# Patient Record
Sex: MALE | URBAN - METROPOLITAN AREA
[De-identification: names, ages, dates, MRNs, and addresses within clinical notes are randomized per-mention and may not be internally consistent; named-entity substitution may affect disease eponyms.]

---

## 2018-12-17 ENCOUNTER — OFFICE VISIT (OUTPATIENT)
Dept: PODIATRY | Facility: CLINIC | Age: 68
End: 2018-12-17
Payer: COMMERCIAL

## 2018-12-17 VITALS
HEIGHT: 75 IN | DIASTOLIC BLOOD PRESSURE: 74 MMHG | WEIGHT: 263 LBS | BODY MASS INDEX: 32.7 KG/M2 | SYSTOLIC BLOOD PRESSURE: 132 MMHG

## 2018-12-17 DIAGNOSIS — M21.619 BUNION: ICD-10-CM

## 2018-12-17 DIAGNOSIS — R20.2 PARESTHESIA OF LEFT FOOT: ICD-10-CM

## 2018-12-17 DIAGNOSIS — M21.40 PES PLANUS, UNSPECIFIED LATERALITY: ICD-10-CM

## 2018-12-17 PROCEDURE — 99203 OFFICE O/P NEW LOW 30 MIN: CPT | Performed by: PODIATRIST

## 2018-12-17 RX ORDER — CLINDAMYCIN HCL 300 MG
CAPSULE ORAL
Refills: 2 | COMMUNITY
Start: 2018-11-12

## 2018-12-17 ASSESSMENT — MIFFLIN-ST. JEOR: SCORE: 2043.59
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
## 2018-12-17 NOTE — PATIENT INSTRUCTIONS
"Thank you for choosing [facility]!    Follow up as needed    DR. FRAZIER'S CLINIC LOCATIONS     MONDAY  [place] TUESDAY & FRIDAY AM  JOSE   [address]   [address]   [place] [address] [place] [address]   [phone]  [phone]  [phone]       [facility]  [facility] [phone]   [address] [facility] APPOINTMENTS: [phone]   [place] [address] BILLING QUESTIONS: [phone]   [phone]         Bunion (hallux abducto valgus)   A bunion is caused by muscle imbalance. The great toe is pulled toward the smaller toes. The metatarsal head is pushed outward creating a lump on the side of your foot. Imbalance is the result of foot structure and instability.   Bunions do not improve with time. They usually enlarge, however this is a fairly slow process. Shoes do not necessarily cause bunions, however, they can hasten development and definitely cause bunions to hurt.   Bunions often run in families. We inherit a certain foot structure, which may be predisposed to bunion development.   Bunion pain is likely a combination of shoes rubbing on the bump, nerve irritation, compression between the toes, joint misalignment, arthritis and altered gait.   Signs & Symptoms of \"Bunions\"   Bunions are usually termed mild, moderate or severe. Just because you have a bunion does not mean you have to have pain. There are some people with very severe bunions and no pain and people with mild bunions and a lot of pain.    1.  Pain on the inside of your foot at the big toe joint (1st MTPJ)    2.  Swelling on the inside of your foot at the big toe joint    3.  Redness on the inside of your foot at the big toe joint    4.  Numbness or burning in the big toe (hallux)    5.  Decreased motion at the big toe joint    6.  Painful bursa (fluid-filled sac) on the inside of your foot at the big toe joint    7.  Pain " "while wearing shoes -especially shoes too narrow or with high heels     8.  Pain during activities    9.  Corn in between the big toe and second toe    10.  Callous formation on the side or bottom of the big toe or big toe joint    11.  Callous under the second toe joint (2nd MTPJ)    12.  Pain in the second toe joint   Treatment for \"Bunions\"   Conservative (non-surgical) treatment will not make the bunion go away, but it will hopefully decrease the signs and symptoms you have and help you get rid of the pain and get you back to your activities.    1.  Wider shoes    2.  Extra depth shoes    3.  Stretch shoes (where bunion is) Cut an \"x\" or cross in the shoe (where bunion is)    4.  Ice    5.  Padding, splints, toe spacers    6.  NSAIDs    7.  Arch supports    8.  Custom orthoses (orthotics)    9.  Change activities    10.  Physical therapy     11.  Surgical treatment for bunions is sometimes needed. If you are limited by pain, cannot fit in shoes comfortably and are not able to do your daily activities then surgery may be a good option for you. There are many different surgical procedures to repair bunions. Your foot doctor (podiatrist) will review your foot exam findings, your x-rays, your age, your health, your lifestyle, your physical activity level and discuss with you which procedure he or she would recommend. Surgical procedures for bunions range from soft tissue repair to cutting and realigning the bones. It is not recommended that you have bunion surgery for cosmetic reasons (you do not like how your foot looks) or because you want to fit in a certain pair of shoes; There is the risk that even after surgery, the bunion will reoccur 9-10% of the time.   Most bunion pain can be improved simply by wearing compatible shoes. People with bunions cannot choose footwear simply because they like the style. Your bunion should determine which shoes are to be worn. Wide shoes with nonirritating seams,soft leather and " a square toe box are most compatible with a bunion. Shoes should fit appropriately right out of the box but may need to be professionally stretched and modified to accommodate the bump. Heels, dress shoes and shoes with pointed toes will not be comfortable.   Shoe inserts or orthotics will often times help with bunion pain, however inserts make shoe fit more challenging. Pads placed over the bunion can also help relieve the pain. Injection may help with nerve irritation.   Bunion surgery involves cutting and repositioning the bones surrounding the bunion. Pins and screws are used to hold the bones in place during the healing process. The goal of bunion surgery is to reduce the size of the bunion bump. Realignment of the toe and joint is attempted.     Some first toes cannot be forced back into normal alignment even with surgery. Surgery is helpful in most cases but does not necessarily create a normal foot.   Healing after surgery requires about six weeks of protection. This allows the bone to heal. Maximum recovery takes about one year. The scar tissue and joint structures require this amount of time to finish the healing process. Expect stiffness, swelling and numbness during that time frame. Bunion surgery does involve side effects. Some side effects are predictable and others are less common but do occur. A scar will be visible and could be irritated by shoes. The shoe may rub on the screw or internal pin requiring surgical removal of these fixation devices. The screw and pin would likely be left in place for a full year. The first toe may loose motion after bunion surgery. The amount of stiffness is variable. Some people never regain normal motion of the first toe. This is due to scar tissue inherent to any surgery. The first toe may drift toward the second toe or away from the second toe. Spreading of the first and second toes is a rare occurrence after bunion surgery. This can be quite bothersome and would  need to be surgically repaired. Toe drift toward the second toe could result in a recurrent bunion and revision surgery. Joint fusion is one option to correct an unstable, drifting toe. This procedure straightens the toe, however, no motion remains. Fusion may be necessary to correct complications of bunion surgery or as the original procedure in severe cases.   All surgical procedures involve risk of infection, numbness, pain, delayed healing, osteotomy dislocation, blood clots, continued foot pain, etc. Bunion surgery is quite complex and should not be taken lightly.   Any skin incision can lead to infection. Deep infection might involve the bone and thus repeat surgery and six weeks of IV antibiotics. Scar tissue can cause nerve pain or numbness. This is generally temporary but can be permanent. We do not have treatments that cure nerve problems. Second toe pain could be related to altered mechanics and pressure transferred to the second toe. Most feet with bunions have pre-existing second toe problems. Delayed bone healing would lengthen the healing time. Some bones simply do not heal. This requires repeat surgery, electronic bone stimulation and/or extended protection. Smokers have an approximate 20% chance of poor bone healing. This is double that of a non-smoker. The bone cut may displace. This may need to be repaired with a second operation. Displacement can cause joint malalignment. Immobility after surgery can cause blood clots in the legs and lungs. This could result in death.   Foot pain is complex. Most feet hurt for more than one reason. Fixing the bunion would not necessarily create a pain free foot. Appropriate shoes, healthy body weight, avoidance of bare foot walking and moderation of activity will always be necessary to enjoy foot comfort. Your bunion may involve arthritis, which is incurable even with surgery. Long standing bunions often involve chronic irritation to the surrounding nerves. Nerve  "pain may not resolve even with reducing the bunion bump since permanent nerve damage may be present   Bunion surgery is nevertheless quite successful. Most surgical patients are pleased with their foot following bunion surgery. Many of the issues described above can be controlled by taking proper care of your foot during the healing process.   Cosmetic bunion surgery is discouraged for the reasons listed above. A bunion that is comfortable when wearing appropriate shoes should simply be treated with appropriate shoes.   Your surgeon would be happy to fully describe any of the above issues. You should pursue a full understanding of the operation,recovery process and any potential problems that could develop.   Prevention of \"Bunions\"    1.  Do not wear high heels if there is a family history of bunions.   2.  Wear shoes that have enough width and depth in the toe box.  Use a motion control arch support if you over-pronate (foot rolls in)       FLAT FEET   Flatfoot is often a complex disorder, with diverse symptoms and varying degrees of deformity and disability. There are several types of flatfoot, all of which have one characteristic in common: partial or total collapse (loss) of the arch.  Other characteristics shared by most types of flatfoot include:   Toe drift,  in which the toes and front part of the foot point outward   The heel tilts toward the outside and the ankle appears to turn in   A tight Achilles tendon, which causes the heel to lift off the ground earlier when walking and may make the problem worse   Bunions and hammertoes may develop as a result of a flatfoot.   Flexible Flatfoot  Flexible flatfoot is one of the most common types of flatfoot. It typically begins in childhood or adolescence and continues into adulthood. It usually occurs in both feet and progresses in severity throughout the adult years. As the deformity worsens, the soft tissues (tendons and ligaments) of the arch may stretch or " tear and can become inflamed.  The term  flexible  means that while the foot is flat when standing (weight-bearing), the arch returns when not standing.  SYMPTOMS  Pain in the heel, arch, ankle, or along the outside of the foot    Rolled-in  ankle (over-pronation)   Pain along the shin bone (shin splint)   General aching or fatigue in the foot or leg   Low back, hip or knee pain.   DIAGNOSIS  In diagnosing flatfoot, the foot and ankle surgeon examines the foot and observes how it looks when you stand and sit. X-rays are usually taken to determine the severity of the disorder. If you are diagnosed with flexible flatfoot but you don t have any symptoms, your surgeon will explain what you might expect in the future.  NON-SURGICAL TREATMENT  If you experience symptoms with flexible flatfoot, the surgeon may recommend non-surgical treatment options, including:  Activity modifications. Cut down on activities that bring you pain and avoid prolonged walking and standing to give your arches a rest.   Weight loss. If you are overweight, try to lose weight. Putting too much weight on your arches may aggravate your symptoms.   Orthotic devices. Your foot and ankle surgeon can provide you with custom orthotic devices for your shoes to give more support to the arches.   Immobilization. In some cases, it may be necessary to use a walking cast or to completely avoid weight-bearing.   Medications. Nonsteroidal anti-inflammatory drugs (NSAIDs), such as ibuprofen, help reduce pain and inflammation.   Physical therapy. Ultrasound therapy or other physical therapy modalities may be used to provide temporary relief.   Shoe modifications. Wearing shoes that support the arches is important for anyone who has flatfoot.   SURGICAL TREATMENT  In some patients whose pain is not adequately relieved by other treatments, surgery may be considered. A variety of surgical techniques is available to correct flexible flatfoot, and one or a combination  of procedures may be required to relieve the symptoms and improve foot function.  In selecting the procedure or combination of procedures for your particular case, the foot and ankle surgeon will take into consideration the extent of your deformity based on the x-ray findings, your age, your activity level, and other factors. The length of the recovery period will vary, depending on the procedure or procedures performed.        Body Mass Index (BMI)  Many things can cause foot and ankle problems. Foot structure, activity level, foot mechanics and injuries are common causes of pain.  One very important issue that often goes unmentioned, is body weight. Extra weight can cause increased stress on muscles, ligaments, bones and tendons.  Sometimes just a few extra pounds is all it takes to put one over her/his threshold. Without reducing that stress, it can be difficult to alleviate pain. Some people are uncomfortable addressing this issue, but we feel it is important for you to think about it. As Foot &  Ankle specialists, our job is addressing the lower extremity problem and possible causes. Regarding extra body weight, we encourage patients to discuss diet and weight management plans with their primary care doctors. It is this team approach that gives you the best opportunity for pain relief and getting you back on your feet.

## 2019-03-21 ENCOUNTER — PRE VISIT (OUTPATIENT)
Dept: UROLOGY | Facility: CLINIC | Age: 69
End: 2019-03-21

## 2019-03-21 NOTE — TELEPHONE ENCOUNTER
MEDICAL RECORDS REQUEST   [facility]  Urology Clinic  [address] [place] [address]  PHONE: [phone]  Fax: [phone]        FUTURE VISIT INFORMATION                                                   Holden Jordan, : 1950 scheduled for future visit at [facility]    APPOINTMENT INFORMATION:    Date: 2019    Provider:  CELESTINO ALBARRAN    Reason for Visit/Diagnosis: FREQUENCY    REFERRAL INFORMATION:    Referring provider:  ANDREY DE DIOS    Specialty: MD    Referring providers clinic:  [facility]    Clinic contact number:  [phone]    RECORDS REQUESTED FOR VISIT                                                     NOTES  STATUS/DETAILS   OFFICE NOTE from referring provider  yes   OFFICE NOTE from other specialist  no   DISCHARGE SUMMARY from hospital  no   DISCHARGE REPORT from the ER  no   OPERATIVE REPORT  yes   MEDICATION LIST  yes       PRE-VISIT CHECKLIST      Record collection complete Yes   Appointment appropriately scheduled           (right time/right provider) Yes   MyChart activation If no, please explain IN PROCESS   Questionnaire complete If no, please explain IN PROCESS     Completed by: Zainab Elkins

## 2019-03-25 ENCOUNTER — DOCUMENTATION ONLY (OUTPATIENT)
Dept: CARE COORDINATION | Facility: CLINIC | Age: 69
End: 2019-03-25

## 2019-03-27 ENCOUNTER — PRE VISIT (OUTPATIENT)
Dept: UROLOGY | Facility: CLINIC | Age: 69
End: 2019-03-27

## 2019-04-02 ENCOUNTER — TELEPHONE (OUTPATIENT)
Dept: UROLOGY | Facility: CLINIC | Age: 69
End: 2019-04-02

## 2019-04-02 NOTE — TELEPHONE ENCOUNTER
Patient notified that he does not need a  after having the UDS procedure, unless he is prescribed Valium. Patient agreed with the plan.      Collette La MA

## 2019-04-02 NOTE — TELEPHONE ENCOUNTER
[facility] Call Center    Phone Message    May a detailed message be left on voicemail: yes    Reason for Call: Other: Pt is wanting to know if there will be anything done at his upcoming appointment that would need him to arrange for someone to drive him. Please give him a call and let him know if he'll be able to drive him self to/from this appointment.     Action Taken: Message routed to:  [facility] ([facility] Urology

## 2019-04-03 ENCOUNTER — PRE VISIT (OUTPATIENT)
Dept: UROLOGY | Facility: CLINIC | Age: 69
End: 2019-04-03

## 2019-04-03 NOTE — TELEPHONE ENCOUNTER
Chief Complaint : UDS[facility]    New Hx/Sx: Increased Frq    Records/Orders/Proced: Available    Pt Contacted: Letter Sent and Spoke with Gabriella    At Rooming: LOREE

## 2019-04-08 ENCOUNTER — OFFICE VISIT (OUTPATIENT)
Dept: UROLOGY | Facility: CLINIC | Age: 69
End: 2019-04-08
Payer: COMMERCIAL

## 2019-04-08 ENCOUNTER — ANCILLARY PROCEDURE (OUTPATIENT)
Dept: RADIOLOGY | Facility: AMBULATORY SURGERY CENTER | Age: 69
End: 2019-04-08
Attending: NURSE PRACTITIONER
Payer: MEDICARE

## 2019-04-08 VITALS
SYSTOLIC BLOOD PRESSURE: 135 MMHG | HEART RATE: 69 BPM | BODY MASS INDEX: 32.5 KG/M2 | WEIGHT: 260 LBS | DIASTOLIC BLOOD PRESSURE: 79 MMHG

## 2019-04-08 DIAGNOSIS — R35.0 URINARY FREQUENCY: ICD-10-CM

## 2019-04-08 DIAGNOSIS — G54.6 PHANTOM LIMB PAIN (H): ICD-10-CM

## 2019-04-08 DIAGNOSIS — R35.0 FREQUENCY OF URINATION: Primary | ICD-10-CM

## 2019-04-08 RX ORDER — CIPROFLOXACIN 500 MG/1
500 TABLET, FILM COATED ORAL ONCE
Status: COMPLETED | OUTPATIENT
Start: 2019-04-08 | End: 2019-04-08

## 2019-04-08 RX ORDER — POLYETHYLENE GLYCOL 3350 17 G/17G
1 POWDER, FOR SOLUTION ORAL DAILY
COMMUNITY

## 2019-04-08 RX ADMIN — CIPROFLOXACIN 500 MG: 500 TABLET, FILM COATED ORAL at 08:05

## 2019-04-08 ASSESSMENT — PAIN SCALES - GENERAL: PAINLEVEL: NO PAIN (0)

## 2019-04-08 NOTE — PROGRESS NOTES
PREPROCEDURE DIAGNOSES:    1. BPH with LUTS (hesitancy, frequency, slowed stream)    POSTPROCEDURE DIAGNOSES:  -Small bladder capacity (283 mL) with delayed filling sensations.  -Fair bladder compliance without DO/DOI.  -No LEAH.  -Good detrusor contraction during voiding to max Pdet 42 cm H2O.  -Slow flow rate (Qmax 5.0 mL/s) with an intermittent, pleateaud flow curve and near-complete bladder emptying (final PVR 83 mL).  -Mostly quiet EMG activity during voiding, with some late activity which correlates with valsalva effort  -BOOI is not suggestive of bladder outlet obstruction  -Fluoroscopy reveals a moderately-trabeculated bladder wall without diverticulae or cellules.  No vesicoureteral reflux was observed.  The bladder neck was closed during filling and appears closed during voiding.  -The patient mentioned his right amputation stump pain several times throughout the course of our study, referencing his insufficient pain control regimen. [facility]. Order placed.    PROCEDURE:    1. Uroflowmetry.  2. Sterile urethral catheterization for measurement of postvoid residual urine volume.  3. Complex filling cystometrogram with measurement of bladder and rectal pressures.  4. Complex voiding cystometrogram with measurement of bladder and rectal pressures.  5. Electromyography of the pelvic floor during urodynamics.  6. Fluoroscopic imaging of the bladder during urodynamics, at least 3 views.    7. Interpretation of urodynamics and flouroscopic imaging.      INDICATIONS FOR PROCEDURE:  Mr. Holden Jordan is a pleasant 68 year old male with BPH with LUTS (hesitancy, frequency, slowed stream). Baseline video urodynamic assessment is requested today by Ananya Parker [facility] to better characterize Mr. Holden Jordan's voiding dysfunction.      VOIDING DIARY:  Voids every 30 minutes- 1 hour during the day,  nocturia x 1.  Episodes of incontinence: None  Total Volume Intake: 2800 mL; water, diet soda.  Total Volume Output: 3000 mL; average voided volume 140 mL, largest voided volume 280 mL.    DESCRIPTION OF PROCEDURE:  Risks, benefits, and alternatives to urodynamics were discussed with the patient and he wished to proceed.  Urodynamics are planned to better assess the primary etiology for Mr. Jordan's urologic dysfunction.  The patient does not currently take anticholinergic medication.  After informed consent was obtained, the patient was taken to the procedure room where uroflowmetry was performed. Findings below.     PRE-STUDY UROFLOWMETRY:  Volume insufficient to analyze  Postvoid residual by catheter: 250 mL.   Pretest urine dipstick was negative for leukocytes and nitrites.    Next a 7F double-lumen urodynamics catheter was inserted into the bladder under sterile technique via the urethra.  A 7F abdominal manometry catheter was placed in the rectum.  EMG pads were placed on both sides of the anal verge.  The bladder was filled with 200 mL of Omnipaque at 30 mL/minute and serial pressures were recorded.  With coughing there was an appropriate rise in vesical and abdominal pressures with no change in detrusor pressure, confirming good study catheter placement.    DURING THE FILLING PHASE:  First sensation: 231 mL.  First Desire: 242 mL.  Strong Desire: 279 mL.  Maximum Capacity: 283 mL.    Uninhibited detrusor contractions: None.  Compliance: Fair. PDet=18 cmH20 at capacity. Compliance ratio of 15.  Continence: No DOI or LEAH  EMG: Mostly concordant during filling.    DURING THE VOIDING PHASE:  Maximum detrusor contraction with void: 42 cm of H2O pressure.   Voided volume: 200 mL.  Maximum flow rate: 5.0 mL/sec.  Average flow rate: 2.2 mL/sec.  Postvoid Residual: 83 mL.  EMG activity: Some increased activity which correlates with late valsalva effort.  Character of voiding curve: Intermittent plateau.  BOOI: 15.6  (suggesting no obstruction - see key below)  [obstructed (ALBARADO index [BOOI] ? 40); equivocal (no definite   obstruction; BOOI 20-40); and no obstruction (BOOI ? 20)]    FLUOROSCOPIC IMAGING OF THE BLADDER DURING URODYNAMICS:  Please note, image numbers on UDS tracings correlate with iSite series numbers on PACS images. Fluoroscopy during today's procedure demonstrated a moderately-trabeculated bladder wall without diverticulae or cellules.  No vesicoureteral reflux was observed.  The bladder neck was closed during filling and appears closed during voiding.  After voiding to completion, all catheters were removed and the patient was brought back into the consultation room to further discuss today's study results.      ASSESSMENT/PLAN:  Mr. Holden Jordan is a pleasant 68 year old male with BPH with LUTS (hesitancy, frequency, slowed stream) who demonstrated the following findings today on urodynamic evaluation:    -Small bladder capacity (283 mL) with delayed filling sensations.  -Fair bladder compliance without DO/DOI.  -No LEAH.  -Good detrusor contraction during voiding to max Pdet 42 cm H2O.  -Slow flow rate (Qmax 5.0 mL/s) with an intermittent, pleateaud flow curve and near-complete bladder emptying (final PVR 83 mL).  -Mostly quiet EMG activity during voiding, with some late activity which correlates with valsalva effort  -BOOI is not suggestive of bladder outlet obstruction  -Fluoroscopy reveals a moderately-trabeculated bladder wall without diverticulae or cellules.  No vesicoureteral reflux was observed.  The bladder neck was closed during filling and appears closed during voiding.  -The patient mentioned his right amputation stump pain several times throughout the course of our study, referencing his insufficient pain control regimen. [facility] to [facility]. Order placed.    [facility] to further discuss today's study results and make plans for how best to proceed.      - A single Cipro was provided for UTI prophylaxis following completion of today's study per department protocol.  The risk of UTI with VUDS is low at ~2.5-3%.      Thank you for allowing me to participate in the care of Mr. Holden Jordan and please don't hesitate to contact me with any questions or concerns.      SONJA Ospina, CNP  Urology Nurse Practitioner

## 2019-04-08 NOTE — PATIENT INSTRUCTIONS
[facility] with [facility].   A referral was made for you to the Pain and Interventional Clinic.    It was a pleasure meeting with you today.  Thank you for allowing me and my team the privilege of caring for you today.  YOU are the reason we are here, and I truly hope we provided you with the excellent service you deserve.  Please let us know if there is anything else we can do for you so that we can be sure you are leaving completely satisfied with your care experience.      Inderjit Schroeder, EMT

## 2019-04-08 NOTE — NURSING NOTE
[facility]  Hx of increased Frq and slow stream  He denies other urinary sx and pain      Chief Complaint   Patient presents with     Urodynamics Study     [facility]       Blood pressure 135/79, pulse 69, weight 117.9 kg (260 lb). Body mass index is 32.5 kg/m .    Patient Active Problem List   Diagnosis     Chronic pain disorder     Major depressive disorder, recurrent episode, moderate (H)     Pain, phantom limb (H)     Stump neuralgia       Allergies   Allergen Reactions     Diazepam      Oxycodone Itching     Tramadol Nausea     Zomig [Zolmitriptan]      Zonisamide        Current Outpatient Medications   Medication Sig Dispense Refill     aspirin 81 MG tablet Take 1 tablet by mouth daily.       Aspirin-Acetaminophen-Caffeine (EXCEDRIN PO) Take  by mouth as needed. Up to once a week.       chlorthalidone (HYGROTEN) 25 MG tablet Take 1 tablet by mouth daily.       cholecalciferol (VITAMIN D) 400 UNIT TABS Take 400 Units by mouth daily.       clindamycin (CLEOCIN) 300 MG capsule TK TWO CS PO ONE HOUR PRIOR TO DENTAL APPOINTMENT.  2     CLINDAMYCIN HCL PO Take 75 mg by mouth       fish oil-omega-3 fatty acids (FISH OIL) 1000 MG capsule Take 2 g by mouth daily.       HYDROcodone-acetaminophen (VICODIN) 5-500 MG per tablet Take 1-2 tablets by mouth 3 times daily as needed.       lovastatin (MEVACOR) 40 MG tablet Take 40 mg by mouth At Bedtime.       Multiple Vitamin (DAILY MULTIVITAMIN PO) Take 1 tablet by mouth daily.       polyethylene glycol (MIRALAX/GLYCOLAX) packet Take 1 packet by mouth daily         Social History     Tobacco Use     Smoking status: Former Smoker     Years: 34.00     Types: Cigarettes     Last attempt to quit: 1978     Years since quittin.2     Smokeless tobacco: Never Used   Substance Use Topics     Alcohol use: None     Drug use: None       Invasive Procedure Safety Checklist:    Procedure: Urodynamics    Action: Complete sections and checkboxes as  appropriate.  Pre-procedure:  1. Patient ID Verified with 2 identifiers ([place] and  or MRN) : YES    2. Procedure and site verified with patient/designee (when able) : YES    3. Accurate consent documentation in medical record : YES    4. H&P (or appropriate assessment) documented in medical record : N/A  H&P must be up to 30 days prior to procedure an updated within 24 hours of                 Procedure as applicable.     5. Relevant diagnostic and radiology test results appropriately labeled and displayed as applicable : YES    6. Blood products, implants, devices, and/or special equipment available for the procedure as applicable : YES    7. Procedure site(s) marked with provider initials [Exclusions: none] : NO    8. Marking not required. Reason : Yes  Procedure does not require site marking    Time Out:     Time-Out performed immediately prior to starting procedure, including verbal and active participation of all team members addressing: YES    1. Correct patient identity.  2. Confirmed that the correct side and site are marked.  3. An accurate procedure to be done.  4. Agreement on the procedure to be done.  5. Correct patient position.  6. Relevant images and results are properly labeled and appropriately displayed.  7. The need to administer antibiotics or fluids for irrigation purposes during the procedure as applicable.  8. Safety precautions based on patient history or medication use.    During Procedure: Verification of correct person, site, and procedure occurs any time the responsibility for care of the patient is transferred to another member of the care team.    The following medication was given: Ciprofloxacin    MEDICATION:  Ciprofloxacin  ROUTE: PO  SITE: Orally  DOSE: 500mg  LOT #: [id number]  : [employer]  EXPIRATION DATE: 10/20  NDC#: 448674   Was there drug waste? No    Prior to administration, verified patient identity using patient's name and date of birth.  Due to  administration, patient instructed to remain in clinic for 15 minutes  afterwards, and to report any adverse reaction to me immediately.    Drug Amount Wasted:  None.  Vial/Syringe: Single dose vial      The following medication was given:     MEDICATION:  Omnipaque (Iohexol Injection)  ROUTE: Provider Administered  SITE: Provider Administered  DOSE: 240mL  LOT #: [id number]  : [employer]  EXPIRATION DATE: 09-11-21  NDC#: [address]-74   Was there drug waste? No    Prior to injection, verified patient identity using patient's name and date of birth.  Due to injection administration, patient instructed to remain in clinic for 15 minutes  afterwards, and to report any adverse reaction to me immediately.    Drug Amount Wasted:  None.  Vial/Syringe: Single dose vial    Holden Jordan comes [facility]    Order has been verified: Yes.      The following medication was given: See Above    Insertion:  14 Fr Coude tipped latex wiseman catheter inserted into urethral meatus in the usual sterile fashion without difficulty.  Received 250 ml yellow urine output.   Catheter secured in place with leg strap: N/A.     Patient did tolerate procedure well.    Patient instructed as to where to call or go for pain, fever, leakage, or decreased urine flow.     Patient instructed as to where to call or go for pain, fever, leakage, or decreased urine flow.     This service provided today was under the direct supervision of Gabriella Polo, who was available if needed.    Inderjit Schroeder, EMT  4/8/2019  7:10 AM

## 2019-04-08 NOTE — LETTER
4/8/2019     RE: Holden Jordan  [address]  [facility] [address]     Dear Colleague,    Thank you for referring your patient, Holden Jordan, to the [facility] UROLOGY AND INST FOR PROSTATE AND UROLOGIC CANCERS at [facility]. Please see a copy of my visit note below.    PREPROCEDURE DIAGNOSES:    1. BPH with LUTS (hesitancy, frequency, slowed stream)    POSTPROCEDURE DIAGNOSES:  -Small bladder capacity (283 mL) with delayed filling sensations.  -Fair bladder compliance without DO/DOI.  -No LEAH.  -Good detrusor contraction during voiding to max Pdet 42 cm H2O.  -Slow flow rate (Qmax 5.0 mL/s) with an intermittent, pleateaud flow curve and near-complete bladder emptying (final PVR 83 mL).  -Mostly quiet EMG activity during voiding, with some late activity which correlates with valsalva effort  -BOOI is not suggestive of bladder outlet obstruction  -Fluoroscopy reveals a moderately-trabeculated bladder wall without diverticulae or cellules.  No vesicoureteral reflux was observed.  The bladder neck was closed during filling and appears closed during voiding.  -The patient mentioned his right amputation stump pain several times throughout the course of our study, referencing his insufficient pain control regimen. [facility] to [facility]. Order placed.    PROCEDURE:    1. Uroflowmetry.  2. Sterile urethral catheterization for measurement of postvoid residual urine volume.  3. Complex filling cystometrogram with measurement of bladder and rectal pressures.  4. Complex voiding cystometrogram with measurement of bladder and rectal pressures.  5. Electromyography of the pelvic floor during urodynamics.  6. Fluoroscopic imaging of the bladder during urodynamics, at least 3 views.    7. Interpretation of urodynamics and flouroscopic imaging.      INDICATIONS FOR PROCEDURE:  Mr. Holden BIRCH  Nikki is a pleasant 68 year old male with BPH with LUTS (hesitancy, frequency, slowed stream). Baseline video urodynamic assessment is requested today by Ananya Parker [facility] to better characterize Mr. Holden Jordan's voiding dysfunction.      VOIDING DIARY:  Voids every 30 minutes- 1 hour during the day, nocturia x 1.  Episodes of incontinence: None  Total Volume Intake: 2800 mL; water, diet soda.  Total Volume Output: 3000 mL; average voided volume 140 mL, largest voided volume 280 mL.    DESCRIPTION OF PROCEDURE:  Risks, benefits, and alternatives to urodynamics were discussed with the patient and he wished to proceed.  Urodynamics are planned to better assess the primary etiology for Mr. Jordan's urologic dysfunction.  The patient does not currently take anticholinergic medication.  After informed consent was obtained, the patient was taken to the procedure room where uroflowmetry was performed. Findings below.     PRE-STUDY UROFLOWMETRY:  Volume insufficient to analyze  Postvoid residual by catheter: 250 mL.   Pretest urine dipstick was negative for leukocytes and nitrites.    Next a 7F double-lumen urodynamics catheter was inserted into the bladder under sterile technique via the urethra.  A 7F abdominal manometry catheter was placed in the rectum.  EMG pads were placed on both sides of the anal verge.  The bladder was filled with 200 mL of Omnipaque at 30 mL/minute and serial pressures were recorded.  With coughing there was an appropriate rise in vesical and abdominal pressures with no change in detrusor pressure, confirming good study catheter placement.    DURING THE FILLING PHASE:  First sensation: 231 mL.  First Desire: 242 mL.  Strong Desire: 279 mL.  Maximum Capacity: 283 mL.    Uninhibited detrusor contractions: None.  Compliance: Fair. PDet=18 cmH20 at capacity. Compliance ratio of 15.  Continence: No DOI or LEAH  EMG: Mostly concordant during filling.    DURING THE VOIDING PHASE:  Maximum  detrusor contraction with void: 42 cm of H2O pressure.   Voided volume: 200 mL.  Maximum flow rate: 5.0 mL/sec.  Average flow rate: 2.2 mL/sec.  Postvoid Residual: 83 mL.  EMG activity: Some increased activity which correlates with late valsalva effort.  Character of voiding curve: Intermittent plateau.  BOOI: 15.6 (suggesting no obstruction - see key below)  [obstructed (ALBARADO index [BOOI] ? 40); equivocal (no definite   obstruction; BOOI 20-40); and no obstruction (BOOI ? 20)]    FLUOROSCOPIC IMAGING OF THE BLADDER DURING URODYNAMICS:  Please note, image numbers on UDS tracings correlate with iSite series numbers on PACS images. Fluoroscopy during today's procedure demonstrated a moderately-trabeculated bladder wall without diverticulae or cellules.  No vesicoureteral reflux was observed.  The bladder neck was closed during filling and appears closed during voiding.  After voiding to completion, all catheters were removed and the patient was brought back into the consultation room to further discuss today's study results.      ASSESSMENT/PLAN:  Mr. Holden Jordan is a pleasant 68 year old male with BPH with LUTS (hesitancy, frequency, slowed stream) who demonstrated the following findings today on urodynamic evaluation:    -Small bladder capacity (283 mL) with delayed filling sensations.  -Fair bladder compliance without DO/DOI.  -No LEAH.  -Good detrusor contraction during voiding to max Pdet 42 cm H2O.  -Slow flow rate (Qmax 5.0 mL/s) with an intermittent, pleateaud flow curve and near-complete bladder emptying (final PVR 83 mL).  -Mostly quiet EMG activity during voiding, with some late activity which correlates with valsalva effort  -BOOI is not suggestive of bladder outlet obstruction  -Fluoroscopy reveals a moderately-trabeculated bladder wall without diverticulae or cellules.  No vesicoureteral reflux was observed.  The bladder neck was closed during filling and appears closed during voiding.  -The patient  mentioned his right amputation stump pain several times throughout the course of our study, referencing his insufficient pain control regimen. [facility] Request[facility] to [facility]. Order placed.    The patient will [facility] to further discuss today's study results and make plans for how best to proceed.      - A single Cipro was provided for UTI prophylaxis following completion of today's study per department protocol.  The risk of UTI with VUDS is low at ~2.5-3%.      Thank you for allowing me to participate in the care of Mr. Holden Jordan and please don't hesitate to contact me with any questions or concerns.      SONJA Ospina, CNP  Urology Nurse Practitioner

## 2019-04-20 ENCOUNTER — OFFICE VISIT (OUTPATIENT)
Dept: UROLOGY | Facility: CLINIC | Age: 69
End: 2019-04-20
Payer: COMMERCIAL

## 2019-04-20 VITALS
BODY MASS INDEX: 32.33 KG/M2 | DIASTOLIC BLOOD PRESSURE: 80 MMHG | SYSTOLIC BLOOD PRESSURE: 148 MMHG | HEIGHT: 75 IN | HEART RATE: 59 BPM | WEIGHT: 260 LBS

## 2019-04-20 DIAGNOSIS — R35.0 URINARY FREQUENCY: Primary | ICD-10-CM

## 2019-04-20 PROBLEM — G54.8 PHANTOM PAIN: Status: ACTIVE | Noted: 2017-02-27

## 2019-04-20 PROBLEM — R52 PHANTOM PAIN: Status: ACTIVE | Noted: 2017-02-27

## 2019-04-20 LAB
ALBUMIN UR-MCNC: NEGATIVE MG/DL
APPEARANCE UR: CLEAR
BILIRUB UR QL STRIP: NEGATIVE
COLOR UR AUTO: YELLOW
GLUCOSE UR STRIP-MCNC: NEGATIVE MG/DL
HGB UR QL STRIP: NEGATIVE
KETONES UR STRIP-MCNC: NEGATIVE MG/DL
LEUKOCYTE ESTERASE UR QL STRIP: NEGATIVE
MUCOUS THREADS #/AREA URNS LPF: PRESENT /LPF
NITRATE UR QL: NEGATIVE
PH UR STRIP: 5 PH (ref 5–7)
RBC #/AREA URNS AUTO: <1 /HPF (ref 0–2)
SOURCE: ABNORMAL
SP GR UR STRIP: 1.01 (ref 1–1.03)
UROBILINOGEN UR STRIP-MCNC: 0 MG/DL (ref 0–2)
WBC #/AREA URNS AUTO: <1 /HPF (ref 0–5)

## 2019-04-20 ASSESSMENT — MIFFLIN-ST. JEOR: SCORE: 2029.98

## 2019-04-20 ASSESSMENT — PAIN SCALES - GENERAL: PAINLEVEL: NO PAIN (0)

## 2019-04-20 NOTE — LETTER
"  RE: Holden Jordan  [address]  [facility] [address]     Dear Colleague,    Thank you for referring your patient, Holden Jordan, to the [facility] UROLOGY AND INST FOR PROSTATE AND UROLOGIC CANCERS at [facility]. Please see a copy of my visit note below.            Chief Complaint:   BPH with LUTS (hesitancy, frequency, slowed stream)         History of Present Illness:    Holden Jordan is a very pleasant 68 year old male [facility] of [facility] who presents with a history of BPH with LUTS (hesitancy, frequency, slowed stream. He presents today to discuss pelvic floor physical therapy. He has [facility], and I recently saw him in our clinic for his urodynamics study on 4/8/19. This study showed a small bladder capacity (283 mL), fair compliance without DO/DOI, good detrusor contraction, and slow flow rate. Fluoroscopy demonstrated a moderately-trabeculated bladder wall without diverticulae or cellules. No vesicoureteral reflux was observed. The bladder neck was closed during filling and appeared closed during voiding.     Since that time, he has seen his [facility], and his tamsulosin was stopped. He reports that he is still taking finasteride for BPH. However, his urologist suggested he try pelvic floor physical therapy for his symptoms. He current voids 10-15 times daily with nocturia 4-5 times at night. He also complains of some vague dysuria, although this is unclear, as patient is somewhat of a poor historian.            Past Medical History:   Chronic pain disorder  Major depressive disorder  Phantom limb pain  Stump neuralgia         Past Surgical History:   Right AKA in 1983  \"Nerve surgery\"/infections for a right stump neuroma in 2012         Medications     Current Outpatient Medications   Medication     aspirin 81 MG tablet     Aspirin-Acetaminophen-Caffeine (EXCEDRIN PO)     chlorthalidone (HYGROTEN) " 25 MG tablet     cholecalciferol (VITAMIN D) 400 UNIT TABS     clindamycin (CLEOCIN) 300 MG capsule     CLINDAMYCIN HCL PO     fish oil-omega-3 fatty acids (FISH OIL) 1000 MG capsule     HYDROcodone-acetaminophen (VICODIN) 5-500 MG per tablet     lovastatin (MEVACOR) 40 MG tablet     Multiple Vitamin (DAILY MULTIVITAMIN PO)     polyethylene glycol (MIRALAX/GLYCOLAX) packet     No current facility-administered medications for this visit.             Family History:   History reviewed. No pertinent family history.         Social History:     Social History     Socioeconomic History     Marital status: Unknown     Spouse name: Not on file     Number of children: Not on file     Years of education: Not on file     Highest education level: Not on file   Occupational History     Not on file   Social Needs     Financial resource strain: Not on file     Food insecurity:     Worry: Not on file     Inability: Not on file     Transportation needs:     Medical: Not on file     Non-medical: Not on file   Tobacco Use     Smoking status: Former Smoker     Years: 34.00     Types: Cigarettes     Last attempt to quit: 1978     Years since quittin.2     Smokeless tobacco: Never Used   Substance and Sexual Activity     Alcohol use: Not on file     Drug use: Not on file     Sexual activity: Not on file   Lifestyle     Physical activity:     Days per week: Not on file     Minutes per session: Not on file     Stress: Not on file   Relationships     Social connections:     Talks on phone: Not on file     Gets together: Not on file     Attends Ca[employer] service: Not on file     Active member of club or organization: Not on file     Attends meetings of clubs or organizations: Not on file     Relationship status: Not on file     Intimate partner violence:     Fear of current or ex partner: Not on file     Emotionally abused: Not on file     Physically abused: Not on file     Forced sexual activity: Not on file   Other Topics Concern  "    Not on file   Social History Narrative     Not on file          Allergies:   Diazepam; Oxycodone; Tramadol; Zomig [zolmitriptan]; and Zonisamide         Physical Exam:   Patient is a 68 year old  male   Vitals: Blood pressure 148/80, pulse 59, height 1.905 m (6' 3\"), weight 117.9 kg (260 lb).  General Appearance Adult: Alert, no acute distress, oriented  HENT: throat/mouth:normal, good dentition  Lungs: no respiratory distress, or pursed lip breathing  Heart: No obvious jugular venous distension present  Abdomen: soft, nontender, no organomegaly or masses, Body mass index is 32.5 kg/m .  Musculoskeltal: Right BK prosthetic  Skin: no suspicious lesions or rashes  Neuro: Alert, oriented, somewhat tangential  Psych: affect and mood normal  Gait: Normal  : deferred      Labs and Pathology:    I personally reviewed all applicable laboratory data and went over findings with patient  Significant for:    PSA RESULTS  1/11/18: 0.49        Imaging:    I personally reviewed all applicable imaging and went over findings with patient.  Significant for:    Results for orders placed or performed in visit on 04/08/19   XR Surgery SOLOMON L/T 5 Min Fluoro w Stills    Narrative    This exam was marked as non-reportable because it will not be read by a   radiologist or a [place] non-radiologist provider.                        Assessment and Plan:     Assessment: 68 year old male with BPH and LUTS, previously treated with finasteride and tamsulosin, without significant symptom relief.     Plan:  -UA/UC today to evaluate for infection  -Referral to pelvic floor physical therapy  -Follow up in 3 months to evaluate symptoms   -Consider cystoscopy if no improvement to evaluate urethra for stricture      Gabriella Polo, CNP  "

## 2019-04-20 NOTE — PATIENT INSTRUCTIONS
Urine sent for testing today.    Referral for Pelvic Floor Physical Therapy.    Follow up with Gabriella Polo CNP in 3 months for a symptom re-check.    It was a pleasure meeting with you today.  Thank you for allowing me and my team the privilege of caring for you today.  YOU are the reason we are here, and I truly hope we provided you with the excellent service you deserve.  Please let us know if there is anything else we can do for you so that we can be sure you are leaving completely satisfied with your care experience.        GUILLE Agarwal

## 2019-04-20 NOTE — PROGRESS NOTES
"        Chief Complaint:   BPH with LUTS (hesitancy, frequency, slowed stream)         History of Present Illness:    Holden Jordan is a very pleasant 68 year old male [facility] who presents with a history of BPH with LUTS (hesitancy, frequency, slowed stream. He presents today to discuss pelvic floor physical therapy. He has been [facility], and I recently saw him in our clinic for his urodynamics study on 4/8/19. This study showed a small bladder capacity (283 mL), fair compliance without DO/DOI, good detrusor contraction, and slow flow rate. Fluoroscopy demonstrated a moderately-trabeculated bladder wall without diverticulae or cellules. No vesicoureteral reflux was observed. The bladder neck was closed during filling and appeared closed during voiding.     Since that time, he has seen his [facility] urolog[facility], and his tamsulosin was stopped. He reports that he is still taking finasteride for BPH. However, his urologist suggested he try pelvic floor physical therapy for his symptoms. He current voids 10-15 times daily with nocturia 4-5 times at night. He also complains of some vague dysuria, although this is unclear, as patient is somewhat of a poor historian.            Past Medical History:   Chronic pain disorder  Major depressive disorder  Phantom limb pain  Stump neuralgia         Past Surgical History:   Right AKA in 1983  \"Nerve surgery\"/infections for a right stump neuroma in 2012         Medications     Current Outpatient Medications   Medication     aspirin 81 MG tablet     Aspirin-Acetaminophen-Caffeine (EXCEDRIN PO)     chlorthalidone (HYGROTEN) 25 MG tablet     cholecalciferol (VITAMIN D) 400 UNIT TABS     clindamycin (CLEOCIN) 300 MG capsule     CLINDAMYCIN HCL PO     fish oil-omega-3 fatty acids (FISH OIL) 1000 MG capsule     HYDROcodone-acetaminophen (VICODIN) 5-500 MG per tablet     lovastatin (MEVACOR) 40 MG tablet     Multiple Vitamin (DAILY " MULTIVITAMIN PO)     polyethylene glycol (MIRALAX/GLYCOLAX) packet     No current facility-administered medications for this visit.             Family History:   History reviewed. No pertinent family history.         Social History:     Social History     Socioeconomic History     Marital status: Unknown     Spouse name: Not on file     Number of children: Not on file     Years of education: Not on file     Highest education level: Not on file   Occupational History     Not on file   Social Needs     Financial resource strain: Not on file     Food insecurity:     Worry: Not on file     Inability: Not on file     Transportation needs:     Medical: Not on file     Non-medical: Not on file   Tobacco Use     Smoking status: Former Smoker     Years: 34.00     Types: Cigarettes     Last attempt to quit: 1978     Years since quittin.2     Smokeless tobacco: Never Used   Substance and Sexual Activity     Alcohol use: Not on file     Drug use: Not on file     Sexual activity: Not on file   Lifestyle     Physical activity:     Days per week: Not on file     Minutes per session: Not on file     Stress: Not on file   Relationships     Social connections:     Talks on phone: Not on file     Gets together: Not on file     Attends Church service: Not on file     Active member of club or organization: Not on file     Attends meetings of clubs or organizations: Not on file     Relationship status: Not on file     Intimate partner violence:     Fear of current or ex partner: Not on file     Emotionally abused: Not on file     Physically abused: Not on file     Forced sexual activity: Not on file   Other Topics Concern     Not on file   Social History Narrative     Not on file            Allergies:   Diazepam; Oxycodone; Tramadol; Zomig [zolmitriptan]; and Zonisamide         Review of Systems:  From intake questionnaire   Negative 14 system review except as noted on HPI, nurse's note.         Physical Exam:   Patient is a  "68 year old  male   Vitals: Blood pressure 148/80, pulse 59, height 1.905 m (6' 3\"), weight 117.9 kg (260 lb).  General Appearance Adult: Alert, no acute distress, oriented  HENT: throat/mouth:normal, good dentition  Lungs: no respiratory distress, or pursed lip breathing  Heart: No obvious jugular venous distension present  Abdomen: soft, nontender, no organomegaly or masses, Body mass index is 32.5 kg/m .  Musculoskeltal: Right BK prosthetic  Skin: no suspicious lesions or rashes  Neuro: Alert, oriented, somewhat tangential  Psych: affect and mood normal  Gait: Normal  : deferred      Labs and Pathology:    I personally reviewed all applicable laboratory data and went over findings with patient  Significant for:    PSA RESULTS  1/11/18: 0.49        Imaging:    I personally reviewed all applicable imaging and went over findings with patient.  Significant for:    Results for orders placed or performed in visit on 04/08/19   XR Surgery SOLOMON L/T 5 Min Fluoro w Stills    Narrative    This exam was marked as non-reportable because it will not be read by a   radiologist or a [place] non-radiologist provider.                        Assessment and Plan:     Assessment: 68 year old male with BPH and LUTS, previously treated with finasteride and tamsulosin, without significant symptom relief.     Plan:  -UA/UC today to evaluate for infection  -Referral to pelvic floor physical therapy  -Follow up in 3 months to evaluate symptoms   -Consider cystoscopy if no improvement to evaluate urethra for stricture      Gabriella Polo, CNP    "

## 2019-04-20 NOTE — NURSING NOTE
Chief Complaint   Patient presents with     RECHECK     Follow up- urinary frequency     Genesis White, A

## 2019-04-21 LAB
BACTERIA SPEC CULT: NO GROWTH
Lab: NORMAL
SPECIMEN SOURCE: NORMAL

## 2019-05-23 ENCOUNTER — THERAPY VISIT (OUTPATIENT)
Dept: PHYSICAL THERAPY | Facility: CLINIC | Age: 69
End: 2019-05-23
Attending: NURSE PRACTITIONER
Payer: COMMERCIAL

## 2019-05-23 DIAGNOSIS — R39.15 URINARY URGENCY: ICD-10-CM

## 2019-05-23 PROCEDURE — 97112 NEUROMUSCULAR REEDUCATION: CPT | Mod: GP | Performed by: PHYSICAL THERAPIST

## 2019-05-23 PROCEDURE — 97162 PT EVAL MOD COMPLEX 30 MIN: CPT | Mod: GP | Performed by: PHYSICAL THERAPIST

## 2019-05-23 PROCEDURE — 97535 SELF CARE MNGMENT TRAINING: CPT | Mod: GP | Performed by: PHYSICAL THERAPIST

## 2019-05-23 PROCEDURE — 97110 THERAPEUTIC EXERCISES: CPT | Mod: GP | Performed by: PHYSICAL THERAPIST

## 2019-05-23 NOTE — PROGRESS NOTES
[facility] Initial Evaluation  Subjective:  The history is provided by the patient. No  was used.                       Objective:  System                                 Pelvic Dysfunction Evaluation:                      SEMG Biofeedback:    Equipment:  EMG    Suraface electrode placement--Perianal:  Superficial transverse perineal L and R side, ground on sacrum  Baseline EMG PM:  1.7-3.6mV    Peak pelvic muscle contraction:  10-12mV, valsalva      Position:  Sidelying                     General     ROS   History of current episode:    Onset date/MD order: 4/20/2019.    CC/Present symptoms: Urinary urgency and frequency; hesitation, slow stream.   Pain rating (0-10): 0  Conditioning is improving/unchanging/worsening: unchanging    Pelvic/Abdominal Surgeries:hernia mesh repair 2012  Hx of or present sexually transmitted disease:no  SMHx: LBP w consideration for interstim; right BKA, left TKA; Per MD note/chart review urology visit 4/20/2019:history of BPH with LUTS (hesitancy, frequency, slowed stream. He presents today to discuss pelvic floor physical therapy. [facility] and I recently saw him in our clinic for his urodynamics study on 4/8/19. This study showed a small bladder capacity (283 mL), fair compliance without DO/DOI, good detrusor contraction, and slow flow rate. Fluoroscopy demonstrated a moderately-trabeculated bladder wall without diverticulae or cellules. No vesicoureteral reflux was observed. The bladder neck was closed during filling and appeared closed during voiding.      Current occupation: retired  Current activity: decreased due to issues with prosthesis  Goals: reduce urgency at night  Red flags:none reported      Urination:  Do you leak on the way to the bathroom or with a strong urge to void? No   Do you leak with cough,sneeze, jumping, running?No   Any other activities that cause  leaking? No   Do you have triggers that make you feel you can't wait to go to the bathroom? No What are they? n/a.  Type of pad and number used per day? n/a  When you leak what is the amount? n/a  How long can you delay the need to urinate? Not asked.   Frequency of nighttime urination:4-5x, does wake up due to nerve pain  Can you stop the flow of urine when on the toilet? Yes  Is the volume of urine passed usually: medium. (8sec rule= 250ml with average bladder storing 400-600ml)  Do you strain to pass urine? No  Do you have a slow or hesitant urinary stream? occasionally  Is urination painful? No  How many bladder infections have you had in last 12 months?0  Fluid intake(one glass is 8oz or one cup) 5glasses/day, 1caffinated glasses/day  0 alcohol glasses/day.  Bowel habits:  Frequency of bowel movements? 2 times a week  Consistancy of stool? soft, Caroline Stool Scale varies type 1-5  Do you ignore the urge to defecate? No  Do you strain to pass stool? occasionally  Treatment/Education provided this session (see flow sheet for additional information):    Self Care Management/Patient education (12 min): Today's session consisted of education regarding pelvic floor muscle anatomy, normal bladder function, urge suppression techniques and/or relaxation techniques as indicated, and instruction in how to complete a bladder diary for assessment next visit. Depending on patient presentation, timed voids, double voids, and proper fluid/fiber intake discussed. Pt was instructed in the pathoanatomy of the pelvic floor utilizing pelvic model.  We discussed what pelvic floor physical therapy is, components of exam, and typical patient progression. Prior to internal PFM exam,  patient was told that they were in control of exam progression, and if at any time were uncomfortable and wished to discontinue we would.              Assessment/Plan:    Patient is a 68 year old male with pelvic complaints.    Patient has the following  significant findings with corresponding treatment plan.                Diagnosis 1:  Urinary urgency/frequency  Decreased proprioception - neuro re-education and therapeutic activities, self care  Impaired muscle performance - biofeedback, electric stimulation, neuro re-education and home program  Decreased function - therapeutic activities      Therapy Evaluation Codes:   1) History comprised of:   Personal factors that impact the plan of care:      Age, Cognition and Time since onset of symptoms.    Comorbidity factors that impact the plan of care are:      Pain at night/rest and LBP, TKA, BKA.     Medications impacting care: Pain.  2) Examination of Body Systems comprised of:   Body structures and functions that impact the plan of care:      Lumbar spine and Pelvis.   Activity limitations that impact the plan of care are:      Frequency and Urgency.  3) Clinical presentation characteristics are:   Evolving/Changing.  4) Decision-Making    Moderate complexity using standardized patient assessment instrument and/or measureable assessment of functional outcome.  Cumulative Therapy Evaluation is: Moderate complexity.    Previous and current functional limitations:  (See Goal Flow Sheet for this information)    Short term and Long term goals: (See Goal Flow Sheet for this information)     Communication ability:  Patient appears to be able to clearly communicate and understand verbal and written communication and follow directions correctly.  Treatment Explanation - The following has been discussed with the patient:   RX ordered/plan of care  Anticipated outcomes  Possible risks and side effects  This patient would benefit from PT intervention to resume normal activities.   Rehab potential is good.    Frequency:  1 X week, once daily  Duration:  for 2 weeks tapering to 1 X a month over 10 weeks  Discharge Plan:  Achieve all LTG.  Independent in home treatment program.  Reach maximal therapeutic benefit.    Please refer  to the daily flowsheet for treatment today, total treatment time and time spent performing 1:1 timed codes.

## 2019-05-24 NOTE — PROGRESS NOTES
[facility] Initial Evaluation  Subjective:                                       Pertinent medical history includes:  High blood pressure, depression, migraines/headaches, implanted devices, concussions/dizziness and overweight.      Current medications:  Anti-depressants, muscle relaxants, high blood pressure medication and pain medication.      Primary job tasks include:  Prolonged sitting.                                Objective:  System    Physical Exam    General     ROS    Assessment/Plan:

## 2019-06-13 ENCOUNTER — OFFICE VISIT (OUTPATIENT)
Dept: ANESTHESIOLOGY | Facility: CLINIC | Age: 69
End: 2019-06-13
Attending: NURSE PRACTITIONER
Payer: MEDICARE

## 2019-06-13 VITALS
BODY MASS INDEX: 32.2 KG/M2 | HEART RATE: 63 BPM | DIASTOLIC BLOOD PRESSURE: 87 MMHG | OXYGEN SATURATION: 96 % | SYSTOLIC BLOOD PRESSURE: 146 MMHG | HEIGHT: 75 IN | WEIGHT: 259 LBS

## 2019-06-13 DIAGNOSIS — M79.2 NEUROPATHIC PAIN: Primary | ICD-10-CM

## 2019-06-13 RX ORDER — LOSARTAN POTASSIUM 100 MG/1
100 TABLET ORAL DAILY
COMMUNITY

## 2019-06-13 RX ORDER — METHOCARBAMOL 500 MG/1
500 TABLET, FILM COATED ORAL 3 TIMES DAILY
COMMUNITY

## 2019-06-13 RX ORDER — FINASTERIDE 5 MG/1
5 TABLET, FILM COATED ORAL DAILY
COMMUNITY

## 2019-06-13 RX ORDER — MAGNESIUM OXIDE 420 MG/1
1 TABLET ORAL 3 TIMES DAILY
COMMUNITY

## 2019-06-13 RX ORDER — METOPROLOL SUCCINATE 50 MG/1
50 TABLET, EXTENDED RELEASE ORAL 2 TIMES DAILY
COMMUNITY

## 2019-06-13 RX ORDER — SERTRALINE HYDROCHLORIDE 100 MG/1
100 TABLET, FILM COATED ORAL DAILY
COMMUNITY

## 2019-06-13 RX ORDER — BUPRENORPHINE 10 UG/H
1 PATCH TRANSDERMAL
Qty: 4 PATCH | Refills: 0 | Status: SHIPPED | OUTPATIENT
Start: 2019-06-13 | End: 2019-07-09

## 2019-06-13 RX ORDER — POTASSIUM CHLORIDE 14.9 MG/ML
20 INJECTION INTRAVENOUS ONCE
COMMUNITY

## 2019-06-13 ASSESSMENT — MIFFLIN-ST. JEOR: SCORE: 2020.45
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
## 2019-06-13 NOTE — PATIENT INSTRUCTIONS
1. Start- buprenorphine (BUTRANS) 10 MCG patch.     2. Stop Hydrocodone. (When you start Butrans patches)    3. Please reach our to clinic if you are having questions regarding this medication. You can do so on [employer], or by calling [phone].    Narcotic prescriptions given today:  buprenorphine (BUTRANS) 10 MCG/HR WK patch - Place 1 patch onto the skin every 7 days, Dispense 4 patchs. You may fill on 6/13/19    Follow up: 4 weeks 7/9/19, at 1:10 pm.         To speak with a nurse, schedule/reschedule/cancel a clinic appointment, or request a medication refill call: [phone]     You can also reach us by [employer]: [email or web address]    For refills, please call on Monday, 1 week before your medication runs out. The doctors are not always in clinic, so this gives us time to get your prescriptions ready.  Please let us know the name of the medication you are requesting a refill of.
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
## 2019-06-13 NOTE — NURSING NOTE
LPN reviewed AVS with Pt.  Pt verbalized an understanding of information, and was asked to contact clinic with questions.    [facility]   [facility] called [facility] to [facility] and was unable to- due to a different process.   The pharmacist informed the LPN that the clinic needed to fax over the prescription, and all supporting documents (clinic note from today's visit) to [phone].  The prescription and the documents will be reviewed by a Provider within in the [facility] system, and then prescriptions will be written and dispensed by the [facility] Provider[facility]Pharmacy.   Pt was updated of this, and verbalized agreement. Pt states they would [facility] with the [facility].     Becky Pendleton LPN

## 2019-07-05 ENCOUNTER — THERAPY VISIT (OUTPATIENT)
Dept: PHYSICAL THERAPY | Facility: CLINIC | Age: 69
End: 2019-07-05
Payer: COMMERCIAL

## 2019-07-05 DIAGNOSIS — R39.15 URINARY URGENCY: ICD-10-CM

## 2019-07-05 PROCEDURE — 97112 NEUROMUSCULAR REEDUCATION: CPT | Mod: GP | Performed by: PHYSICAL THERAPIST

## 2019-07-05 PROCEDURE — 97535 SELF CARE MNGMENT TRAINING: CPT | Mod: GP | Performed by: PHYSICAL THERAPIST

## 2019-07-09 ENCOUNTER — OFFICE VISIT (OUTPATIENT)
Dept: ANESTHESIOLOGY | Facility: CLINIC | Age: 69
End: 2019-07-09
Payer: MEDICARE

## 2019-07-09 VITALS
HEART RATE: 72 BPM | WEIGHT: 253 LBS | DIASTOLIC BLOOD PRESSURE: 80 MMHG | HEIGHT: 75 IN | BODY MASS INDEX: 31.46 KG/M2 | SYSTOLIC BLOOD PRESSURE: 131 MMHG | RESPIRATION RATE: 16 BRPM

## 2019-07-09 DIAGNOSIS — T87.89 STUMP PAIN (H): Primary | ICD-10-CM

## 2019-07-09 DIAGNOSIS — M79.609 STUMP PAIN (H): Primary | ICD-10-CM

## 2019-07-09 DIAGNOSIS — M79.2 NEUROPATHIC PAIN: ICD-10-CM

## 2019-07-09 RX ORDER — GABAPENTIN 100 MG/1
100 CAPSULE ORAL 3 TIMES DAILY
Qty: 90 CAPSULE | Refills: 1 | Status: SHIPPED | OUTPATIENT
Start: 2019-07-09

## 2019-07-09 RX ORDER — BUPRENORPHINE 10 UG/H
1 PATCH TRANSDERMAL
Qty: 4 PATCH | Refills: 0 | Status: SHIPPED | OUTPATIENT
Start: 2019-07-18

## 2019-07-09 ASSESSMENT — MIFFLIN-ST. JEOR: SCORE: 1993.23

## 2019-07-09 ASSESSMENT — PATIENT HEALTH QUESTIONNAIRE - PHQ9: SUM OF ALL RESPONSES TO PHQ QUESTIONS 1-9: 0

## 2019-07-09 ASSESSMENT — PAIN SCALES - GENERAL: PAINLEVEL: MILD PAIN (2)

## 2019-07-09 NOTE — NURSING NOTE
LPN reviewed AVS with Pt.  Pt verbalized an understanding of information, and was asked to contact clinic with questions.    Medication prescriptions faxed to- [phone]. As [facility].     Becky Pendleton LPN      Previously documented on 6/13/19-     Pt is [facility] that [facility].   LPN called the [facility] pharmacy to transfer the prescription and was unable to- due to a different process.   The pharmacist informed the LPN that the clinic needed to fax over the prescription, and all supporting documents (clinic note from today's visit) to [phone].  The prescription and the documents will be reviewed by a Provider within in the [facility] system, and then prescriptions will be written and dispensed by the [facility] Provider[facility]Pharmacy.   Pt was updated of this, and verbalized agreement. Pt states they would prefer to [facility] with the [facility].
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
## 2019-07-09 NOTE — PATIENT INSTRUCTIONS
[facility], Please inform your care team of the recommendations made by the provider today. [facility] so [facility] have record of this information.     1. Start Gabapentin 100 mg, Take Morning, afternoon and Bedtime.   Continue with 3 times daily.     2. Continue with buprenorphine, (BUTRANS) 10 MCG/HR WK patch. Change patch every 7 days.    Follow up: Middle of August, with Dr. Pike.     We are relocating to the 5th floor after August 2, 2019. If your next appointment is after August 2nd, check in on the 5th floor to be seen for your next appointment.      To speak with a nurse, schedule/reschedule/cancel a clinic appointment, or request a medication refill call: [phone]     You can also reach us by [facility]: [email or web address]    For refills, please call on Monday, 1 week before your medication runs out. The doctors are not always in clinic, so this gives us time to get your prescriptions ready.  Please let us know the name of the medication you are requesting a refill of.

## 2019-07-18 ENCOUNTER — NURSE TRIAGE (OUTPATIENT)
Dept: NURSING | Facility: CLINIC | Age: 69
End: 2019-07-18

## 2019-07-18 ENCOUNTER — TELEPHONE (OUTPATIENT)
Dept: ANESTHESIOLOGY | Facility: CLINIC | Age: 69
End: 2019-07-18

## 2019-07-18 NOTE — TELEPHONE ENCOUNTER
[facility] Call Center    Phone Message    May a detailed message be left on voicemail: yes    Reason for Call: Symptoms or Concerns     Current symptom or concern: Lots of pain    Symptoms have been present for:  2 day(s)    Has patient previously been seen for this? Yes    By: Dr. Pike    Date: 7/9/19    Are there any new or worsening symptoms? Yes: Very sharp nerve pain.   Repeating over and over again from 6pm on.        Holden would like to be seen for a follow up ASAP.  First available was 8/18 and he did not want to schedule that far out.       Action Taken: Message routed to:  [facility] ([facility] pain

## 2019-07-18 NOTE — TELEPHONE ENCOUNTER
I called and spoke with the patient and assisted in scheduling an appointment 07/19/2019.    Danielle Bruno, CMA

## 2019-07-18 NOTE — TELEPHONE ENCOUNTER
Put on a pain patch, butron and gabapentin. Gabapentin isn't doing anything. Butron was doing wonders.  Yesterday suddenly had nerve jolts, worse pain in stump ever, one after another almost non stop.  Stopped around 10 p.m. Last night it subsided.  Not bad since. Still having some nerve jolts.  More normal now. Was severe yesterday. Nothing phased it.  Last time he talked with the MD he was praising the patch.  Patch supposed to work for 7 days. He disagrees. Patch only works for 5-6 days maybe.  At 5 days starts with severe nerve pain again.  Patient needs to talk with MD, have an appointment with the MD. It's not working.  I connected with the page  to get the [facility]. They will connect the patient with the clinic.  Meghan Martínez RN-[facility]

## 2019-07-19 ENCOUNTER — OFFICE VISIT (OUTPATIENT)
Dept: ANESTHESIOLOGY | Facility: CLINIC | Age: 69
End: 2019-07-19
Payer: MEDICARE

## 2019-07-19 VITALS
SYSTOLIC BLOOD PRESSURE: 133 MMHG | BODY MASS INDEX: 31.46 KG/M2 | WEIGHT: 253 LBS | RESPIRATION RATE: 16 BRPM | HEART RATE: 67 BPM | DIASTOLIC BLOOD PRESSURE: 81 MMHG | HEIGHT: 75 IN

## 2019-07-19 DIAGNOSIS — M79.2 NEUROPATHIC PAIN: Primary | ICD-10-CM

## 2019-07-19 ASSESSMENT — MIFFLIN-ST. JEOR: SCORE: 1993.23

## 2019-07-19 ASSESSMENT — PAIN SCALES - GENERAL: PAINLEVEL: MILD PAIN (3)

## 2019-07-19 NOTE — LETTER
"7/19/2019       RE: Holden Jordan  [address]  [facility] [address]     Dear Colleague,    Thank you for referring your patient, Holden Jordan, to the [facility] at [facility]. Please see a copy of my visit note below.    [facility]    Date of visit: 7/19/2019    Chief complaint:   Chief Complaint   Patient presents with     Pain Management     Follow up        Interval history:  Holden Jordan was last seen by me on on 6/13/2019 as well as on 7/09/2019.      Recommendations/plan at the last visit included:  1. Start Gabapentin 100 mg, Take Morning, afternoon and Bedtime.   Continue with 3 times daily.      2. Continue with buprenorphine, (BUTRANS) 10 MCG/HR WK patch. Change patch every 7 days.    Since his last visit, Holden Jordan reports:  - overall improvement in \"nerve jolts\" since starting butrans patch  - experienced excruciating episode of jolts that lasted 3 hours during one day; these extreme episodes occur approx 1x/month  - is interested in a solution for these extreme episodes that are debilitating (occurring once per month)  - has not had neuroma ablation, but is scared of trying this procedure due to [facility]  - has been informed about SCS option, but is worried about any interventions at this time  - started gabapentin, but states it is not helping      Pain scores:  Pain intensity on average is 5 on a scale of 0-10.     Current pain treatments:   Butrans patch 10mcg  Gabapentin 100mg TID    Past pain treatments:  Gabapentin  Hydrocodone-Acetaminophen  Methocarbamol  Fentanyl patches  Lidocaine patches    Side Effects: Sedation with higher doses of gabapentin    Medications:  Current Outpatient Medications   Medication Sig Dispense Refill     aspirin 81 MG tablet Take 1 tablet by mouth daily.       Aspirin-Acetaminophen-Caffeine (EXCEDRIN PO) Take  " "by mouth as needed. Up to once a week.       buprenorphine (BUTRANS) 10 MCG/HR WK patch Place 1 patch onto the skin every 7 days 4 patch 0     chlorthalidone (HYGROTEN) 25 MG tablet Take 1 tablet by mouth daily.       cholecalciferol (VITAMIN D) 400 UNIT TABS Take 400 Units by mouth daily.       clindamycin (CLEOCIN) 300 MG capsule TK TWO CS PO ONE HOUR PRIOR TO DENTAL APPOINTMENT.  2     CLINDAMYCIN HCL PO Take 75 mg by mouth       finasteride (PROSCAR) 5 MG tablet Take 5 mg by mouth daily       fish oil-omega-3 fatty acids (FISH OIL) 1000 MG capsule Take 2 g by mouth daily.       gabapentin (NEURONTIN) 100 MG capsule Take 1 capsule (100 mg) by mouth 3 times daily 90 capsule 1     HYDROcodone-acetaminophen (VICODIN) 5-500 MG per tablet Take 1-2 tablets by mouth 3 times daily as needed.       losartan (COZAAR) 100 MG tablet Take 100 mg by mouth daily       lovastatin (MEVACOR) 40 MG tablet Take 40 mg by mouth At Bedtime.       Magnesium Oxide 420 MG TABS Take 1 tablet by mouth 3 times daily       melatonin 3 MG CAPS Take 1 capsule by mouth At Bedtime       methocarbamol (ROBAXIN) 500 MG tablet Take 500 mg by mouth 3 times daily       metoprolol succinate ER (TOPROL-XL) 50 MG 24 hr tablet Take 50 mg by mouth 2 times daily       Multiple Vitamin (DAILY MULTIVITAMIN PO) Take 1 tablet by mouth daily.       polyethylene glycol (MIRALAX/GLYCOLAX) packet Take 1 packet by mouth daily       potassium chloride 20 MEQ/100ML infusion Inject 20 mEq into the vein once       sertraline (ZOLOFT) 100 MG tablet Take 100 mg by mouth daily         Medical History: any changes in medical history since they were last seen? No    Review of Systems:  The 14 system ROS was reviewed from the intake questionnaire, and is positive for: headaches  Any bowel or bladder problems: no  Mood: wnl    Physical Exam:  Blood pressure 133/81, pulse 67, resp. rate 16, height 1.905 m (6' 3\"), weight 114.8 kg (253 lb).  General: AAOx3, NAD  Gait: Normal " "with prosthetic RLE  MSK exam: TTP at posterior thigh (fingerpoitn tenderness at neuroma site)    Assessment:   1. Stump Pain/Neuroma  2. Chronic opioid use    Holden Jordan is a 69 year old male who is seen at the pain clinic for follow-up for pain control due to neuroma. He has been using butrans patch (switch from norco) and is finding this beneficial in maintaining a steady state for pain control compared to \"chasing\" the pain with hydroocodone prn in the past.  He is concerned that he had an exacerbation of his neuroma pain this week. He describes a 3 hour period with increased severity and frequency of jolts. He is looking for options during these rare but excruciating times. We discussed that increasing his butrans patch dose would not be appropriate at this time for these episodes. However, he can take one hydrocodone tablet for breakthrough pain, if this occurs infrequently. We discussed not taking butrans patch and hydrocodone at the same time except for the infrequent (e.g. approx one to two times per month) periods.  We also discussed procedure options. Although he remains reluctant to try an ablation procedure as he had a very negative experience in the past, we discussed this as something to consider in the future.  We also discussed spinal cord stimulation. He stated that he will think about these procedural options.      Plan:  1. Physical Therapy:  Not indicated  2. Clinical Health Psychologist to address issues of relaxation, behavioral change, coping style, and other factors important to improvement.  Not indicated  3. Diagnostic Studies:  Not indicated  4. Medication Management:  Continue current medication regimen  5. Further procedures recommended: Will discuss neuroma ablation and/or SCS trial at next follow up appointment  6. Recommendations to PCP: none   7. Follow up: 4 weeks    Carey Ariza MD    Pain Medicine, Department of Anesthesiology  [facility]  "

## 2019-07-19 NOTE — PATIENT INSTRUCTIONS
1. Continue medications at current dosages.      2. Information on spinal cord stimulator provided for you today in clinic.      3. Consider neuroma ablation to discuss at your next appointment.        Follow up: 4 weeks with Dr. Annita Bryant are relocating to the 5th floor after August 2, 2019. If your next appointment is after August 2nd, check in on the 5th floor to be seen for your next appointment.      To speak with a nurse, schedule/reschedule/cancel a clinic appointment, or request a medication refill call: [phone]     You can also reach us by [employer]: [email or web address]    For refills, please call on Monday, 1 week before your medication runs out. The doctors are not always in clinic, so this gives us time to get your prescriptions ready.  Please let us know the name of the medication you are requesting a refill of.                              - - -

## 2019-07-19 NOTE — NURSING NOTE
AVS given and reviewed with pt.  Pt verbalized understanding and declined any questions.     Dolly Coronel, RN, BSN

## 2019-07-19 NOTE — PROGRESS NOTES
"[facility]    Date of visit: 7/19/2019    Chief complaint:   Chief Complaint   Patient presents with     Pain Management     Follow up        Interval history:  Holden Jordan was last seen by me on on 6/13/2019 as well as on 7/09/2019.      Recommendations/plan at the last visit included:  1. Start Gabapentin 100 mg, Take Morning, afternoon and Bedtime.   Continue with 3 times daily.      2. Continue with buprenorphine, (BUTRANS) 10 MCG/HR WK patch. Change patch every 7 days.    Since his last visit, Holden Jordan reports:  - overall improvement in \"nerve jolts\" since starting butrans patch  - experienced excruciating episode of jolts that lasted 3 hours during one day; these extreme episodes occur approx 1x/month  - is interested in a solution for these extreme episodes that are debilitating (occurring once per month)  - has not had neuroma ablation, but is scared of trying this procedure due [facility]  - has been informed about SCS option, but is worried about any interventions at this time  - started gabapentin, but states it is not helping      Pain scores:  Pain intensity on average is 5 on a scale of 0-10.     Current pain treatments:   Butrans patch 10mcg  Gabapentin 100mg TID    Past pain treatments:  Gabapentin  Hydrocodone-Acetaminophen  Methocarbamol  Fentanyl patches  Lidocaine patches    Side Effects: Sedation with higher doses of gabapentin    Medications:  Current Outpatient Medications   Medication Sig Dispense Refill     aspirin 81 MG tablet Take 1 tablet by mouth daily.       Aspirin-Acetaminophen-Caffeine (EXCEDRIN PO) Take  by mouth as needed. Up to once a week.       buprenorphine (BUTRANS) 10 MCG/HR WK patch Place 1 patch onto the skin every 7 days 4 patch 0     chlorthalidone (HYGROTEN) 25 MG tablet Take 1 tablet by mouth daily.       cholecalciferol (VITAMIN D) 400 UNIT TABS Take 400 Units by mouth daily.       clindamycin " "(CLEOCIN) 300 MG capsule TK TWO CS PO ONE HOUR PRIOR TO DENTAL APPOINTMENT.  2     CLINDAMYCIN HCL PO Take 75 mg by mouth       finasteride (PROSCAR) 5 MG tablet Take 5 mg by mouth daily       fish oil-omega-3 fatty acids (FISH OIL) 1000 MG capsule Take 2 g by mouth daily.       gabapentin (NEURONTIN) 100 MG capsule Take 1 capsule (100 mg) by mouth 3 times daily 90 capsule 1     HYDROcodone-acetaminophen (VICODIN) 5-500 MG per tablet Take 1-2 tablets by mouth 3 times daily as needed.       losartan (COZAAR) 100 MG tablet Take 100 mg by mouth daily       lovastatin (MEVACOR) 40 MG tablet Take 40 mg by mouth At Bedtime.       Magnesium Oxide 420 MG TABS Take 1 tablet by mouth 3 times daily       melatonin 3 MG CAPS Take 1 capsule by mouth At Bedtime       methocarbamol (ROBAXIN) 500 MG tablet Take 500 mg by mouth 3 times daily       metoprolol succinate ER (TOPROL-XL) 50 MG 24 hr tablet Take 50 mg by mouth 2 times daily       Multiple Vitamin (DAILY MULTIVITAMIN PO) Take 1 tablet by mouth daily.       polyethylene glycol (MIRALAX/GLYCOLAX) packet Take 1 packet by mouth daily       potassium chloride 20 MEQ/100ML infusion Inject 20 mEq into the vein once       sertraline (ZOLOFT) 100 MG tablet Take 100 mg by mouth daily         Medical History: any changes in medical history since they were last seen? No    Review of Systems:  The 14 system ROS was reviewed from the intake questionnaire, and is positive for: headaches  Any bowel or bladder problems: no  Mood: wnl    Physical Exam:  Blood pressure 133/81, pulse 67, resp. rate 16, height 1.905 m (6' 3\"), weight 114.8 kg (253 lb).  General: AAOx3, NAD  Gait: Normal with prosthetic RLE  MSK exam: TTP at posterior thigh (fingerpoitn tenderness at neuroma site)    Assessment:   1. Stump Pain/Neuroma  2. Chronic opioid use    Holden Jordan is a 69 year old male who is seen at the pain clinic for follow-up for pain control due to neuroma. He has been using butrans patch " "(switch from norco) and is finding this beneficial in maintaining a steady state for pain control compared to \"chasing\" the pain with hydroocodone prn in the past.  He is concerned that he had an exacerbation of his neuroma pain this week. He describes a 3 hour period with increased severity and frequency of jolts. He is looking for options during these rare but excruciating times. We discussed that increasing his butrans patch dose would not be appropriate at this time for these episodes. However, he can take one hydrocodone tablet for breakthrough pain, if this occurs infrequently. We discussed not taking butrans patch and hydrocodone at the same time except for the infrequent (e.g. approx one to two times per month) periods.  We also discussed procedure options. Although he remains reluctant to try an ablation procedure as he had a very negative experience in the past, we discussed this as something to consider in the future.  We also discussed spinal cord stimulation. He stated that he will think about these procedural options.      Plan:  1. Physical Therapy:  Not indicated  2. Clinical Health Psychologist to address issues of relaxation, behavioral change, coping style, and other factors important to improvement.  Not indicated  3. Diagnostic Studies:  Not indicated  4. Medication Management:  Continue current medication regimen  5. Further procedures recommended: Will discuss neuroma ablation and/or SCS trial at next follow up appointment  6. Recommendations to PCP: none   7. Follow up: 4 weeks    Carey Ariza MD    Pain Medicine, Department of Anesthesiology  [facility]        "

## 2019-07-25 ENCOUNTER — THERAPY VISIT (OUTPATIENT)
Dept: PHYSICAL THERAPY | Facility: CLINIC | Age: 69
End: 2019-07-25
Payer: COMMERCIAL

## 2019-07-25 DIAGNOSIS — R39.15 URINARY URGENCY: ICD-10-CM

## 2019-07-25 PROCEDURE — 97535 SELF CARE MNGMENT TRAINING: CPT | Mod: GP | Performed by: PHYSICAL THERAPIST
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
## 2019-07-26 ENCOUNTER — TELEPHONE (OUTPATIENT)
Dept: ANESTHESIOLOGY | Facility: CLINIC | Age: 69
End: 2019-07-26

## 2019-07-26 DIAGNOSIS — M79.2 NEUROPATHIC PAIN: ICD-10-CM

## 2019-07-26 DIAGNOSIS — M79.609 STUMP PAIN (H): Primary | ICD-10-CM

## 2019-07-26 DIAGNOSIS — T87.89 STUMP PAIN (H): Primary | ICD-10-CM

## 2019-07-26 NOTE — PROGRESS NOTES
PROGRESS  REPORT    Progress reporting period is from 5/23/2019 to 7/25/2019.       SUBJECTIVE  Subjective changes noted by patient:  no.  Subjective: Patient reports frustration over pain patch not working anymore and difficulty with communicating with caregivers regarding needs for his sharp jolts of pain during the night and potential left knee prosthetic loosening.(emailed Jada canas)    Current pain level is 8/10  .     Previous pain level was  8/10  .   Changes in function:  None  Adverse reaction to treatment or activity: None    OBJECTIVE  Changes noted in objective findings:  None  Objective: Today's treatment consisted of greater than 30 minutes and discussion with patient regarding importance of tracking fiber intake to reduce constipation which she reports does influence his urinary habits.  We also discussed the current pain management strategies he is using and that he is doing a good job in his management and we developed a question checklist for him to utilize during his next pain management visit.  After our visit patient did seem more calm regarding his current situation, and this is of importance as it can be impactful on his bladder habits.     ASSESSMENT/PLAN  Updated problem list and treatment plan: Diagnosis 1:  Urinary Urgency/frequency  Decreased proprioception - neuro re-education and therapeutic activities  Impaired muscle performance - biofeedback, electric stimulation and neuro re-education  Decreased function - therapeutic activities  Impaired posture - neuro re-education  STG/LTGs have been met or progress has been made towards goals:  Yes (See Goal flow sheet completed today.)  Assessment of Progress: The patient's condition is unchanged.  Self Management Plans:  Patient has been instructed in a home treatment program.  I have re-evaluated this patient and find that the nature, scope, duration and intensity of the therapy is appropriate for the medical condition of the  patient.  Holden continues to require the following intervention to meet STG and LTG's:  Further consult w pain managment    Recommendations:  This patient would benefit from further evaluation.    Please refer to the daily flowsheet for treatment today, total treatment time and time spent performing 1:1 timed codes.

## 2019-07-26 NOTE — TELEPHONE ENCOUNTER
[facility] Call Center    Phone Message    May a detailed message be left on voicemail: yes    Reason for Call: Medication Question or concern regarding medication   Prescription Clarification  Name of Medication: Hydrocodone  Prescribing Provider: Dr. Ariza or Dr. Pike      [facility]      What on the order needs clarification? Holden has been having additional Neuropathic pain.  When seen in clinic by Dr. Ariza on 7.19.19 she indicated that Holden could supplement his patch with Hydrocodone when he has episodes/sessions of extended jolts of neuropathic pain.   Please provide to Dr. Ruth Verner, [facility],  the amount of Hydrocodone that would be appropriate for Holden to take in addition to the patch he is currently on when he has episodes of severe neuropathic pain.  Fax order to [phone]          Action Taken: Message routed to:  [facility] ([facility] p pain

## 2019-07-31 NOTE — TELEPHONE ENCOUNTER
LPN Faxed over Dr[facility]'s [facility] to the [facility]- per their request.     LPN underlined the following for the provider-   We discussed that increasing his butrans patch dose would not be appropriate at this time for these episodes. However, he can take one hydrocodone tablet for breakthrough pain, if this occurs infrequently. We discussed not taking butrans patch and hydrocodone at the same time except for the infrequent (e.g. approx one to two times per month) periods.    Becky Pendleton LPN

## 2019-08-05 NOTE — TELEPHONE ENCOUNTER
Patient and Wife called the clinic- They were looking on clarification from the Providers (Dr. Pike or Dr. Reddy)   Regarding them using Hydrocodone for break through pain relief.     Pt was updated that their PCP was faxed Dr. Ariza's note- stating the following:     We discussed that increasing his butrans patch dose would not be appropriate at this time for these episodes. However, he can take one hydrocodone tablet for breakthrough pain, if this occurs infrequently. We discussed not taking butrans patch and hydrocodone at the same time except for the infrequent (e.g. approx one to two times per month) periods.    Pt stated that this is not what was explained to them in clinic, and that they were wanting clarification on the matter, as they were under the impression that the provider was going to write them a prescription for Hydrocodone as well. Pt wanted clarification.     Pt was informed that the providers will be out of the office until next week- but that LPN could follow up with them- after discussing. Pt verbalized agreement.

## 2019-08-09 RX ORDER — HYDROCODONE BITARTRATE AND ACETAMINOPHEN 5; 325 MG/1; MG/1
1 TABLET ORAL PRN
Qty: 10 TABLET | Refills: 0 | Status: SHIPPED | OUTPATIENT
Start: 2019-08-09

## 2019-08-09 NOTE — TELEPHONE ENCOUNTER
CHANTE followed up with Dr. Ariza on pt's request for clarification on Norco prescription.     MD was agreeable to prescribe 10 tablets for the pt to use. This will be re-assessed at the pt's clinic appointment, later this month.     LPN called pt to notify them of the prescription, and [facility]- [phone].    Becky Pendleton LPN

## 2019-08-15 ENCOUNTER — OFFICE VISIT (OUTPATIENT)
Dept: ANESTHESIOLOGY | Facility: CLINIC | Age: 69
End: 2019-08-15
Payer: MEDICARE

## 2019-08-15 VITALS
BODY MASS INDEX: 31.46 KG/M2 | HEART RATE: 67 BPM | HEIGHT: 75 IN | DIASTOLIC BLOOD PRESSURE: 85 MMHG | SYSTOLIC BLOOD PRESSURE: 143 MMHG | WEIGHT: 253 LBS

## 2019-08-15 DIAGNOSIS — M79.609 STUMP PAIN (H): Primary | ICD-10-CM

## 2019-08-15 DIAGNOSIS — T87.89 STUMP PAIN (H): Primary | ICD-10-CM

## 2019-08-15 ASSESSMENT — ANXIETY QUESTIONNAIRES
7. FEELING AFRAID AS IF SOMETHING AWFUL MIGHT HAPPEN: NOT AT ALL
4. TROUBLE RELAXING: MORE THAN HALF THE DAYS
6. BECOMING EASILY ANNOYED OR IRRITABLE: SEVERAL DAYS
7. FEELING AFRAID AS IF SOMETHING AWFUL MIGHT HAPPEN: NOT AT ALL
3. WORRYING TOO MUCH ABOUT DIFFERENT THINGS: SEVERAL DAYS
5. BEING SO RESTLESS THAT IT IS HARD TO SIT STILL: NOT AT ALL
GAD7 TOTAL SCORE: 10
2. NOT BEING ABLE TO STOP OR CONTROL WORRYING: NEARLY EVERY DAY
1. FEELING NERVOUS, ANXIOUS, OR ON EDGE: NEARLY EVERY DAY
GAD7 TOTAL SCORE: 10

## 2019-08-15 ASSESSMENT — PAIN SCALES - GENERAL: PAINLEVEL: MILD PAIN (2)

## 2019-08-15 ASSESSMENT — MIFFLIN-ST. JEOR: SCORE: 1993.23

## 2019-08-15 NOTE — LETTER
"8/15/2019       RE: Holden Jordan  [address]  [facility] [address]     Dear Colleague,    Thank you for referring your patient, Holden Jordan, to the [facility] at [facility]. Please see a copy of my visit note below.    [facility]    Date of visit: 8/15/2019    Chief complaint:   Chief Complaint   Patient presents with     Pain Management     Follow up/ refills       Interval history:  Holden Jordan was last seen by me on 7/19/2019.      Recommendations/plan at the last visit included:  1. Physical Therapy:  Not indicated  2. Clinical Health Psychologist to address issues of relaxation, behavioral change, coping style, and other factors important to improvement.  Not indicated  3. Diagnostic Studies:  Not indicated  4. Medication Management:  Continue current medication regimen  5. Further procedures recommended: Will discuss neuroma ablation and/or SCS trial at next follow up appointment  6. Recommendations to PCP: none   7. Follow up: 4 weeks    Since his last visit, Holden Jordan reports:  - started using hydroxyzine and notes this helpful along with patch  - has been having fewer episodes of \"jolts\"  - concerned that during worsening episodes of jolts he would require breakthrough medication  - discussed that it is okay to take 1 5mg tablet of Norco during acute exacerbations  - Zbigniew Coulter study - enzyme study    Pain scores:  Pain intensity on average is 2 on a scale of 0-10.     Current pain treatments:   Butrans Patch 10mcg  Hydroxyzine  Gabapentin 100mg TID  Hydrocodone-Acetaminophen 5-325 PRN pain (10 tabs/month available)      Side Effects: denies any problems    Medications:  Current Outpatient Medications   Medication Sig Dispense Refill     aspirin 81 MG tablet Take 1 tablet by mouth daily.       Aspirin-Acetaminophen-Caffeine (EXCEDRIN PO) Take  by mouth as needed. Up to once a week.   "     buprenorphine (BUTRANS) 10 MCG/HR WK patch Place 1 patch onto the skin every 7 days 4 patch 0     chlorthalidone (HYGROTEN) 25 MG tablet Take 1 tablet by mouth daily.       cholecalciferol (VITAMIN D) 400 UNIT TABS Take 400 Units by mouth daily.       clindamycin (CLEOCIN) 300 MG capsule TK TWO CS PO ONE HOUR PRIOR TO DENTAL APPOINTMENT.  2     CLINDAMYCIN HCL PO Take 75 mg by mouth       finasteride (PROSCAR) 5 MG tablet Take 5 mg by mouth daily       fish oil-omega-3 fatty acids (FISH OIL) 1000 MG capsule Take 2 g by mouth daily.       gabapentin (NEURONTIN) 100 MG capsule Take 1 capsule (100 mg) by mouth 3 times daily 90 capsule 1     HYDROcodone-acetaminophen (NORCO) 5-325 MG tablet Take 1 tablet by mouth as needed for severe pain 10 tablets to last a minimum of 30 days. 10 tablet 0     HYDROcodone-acetaminophen (VICODIN) 5-500 MG per tablet Take 1-2 tablets by mouth 3 times daily as needed.       losartan (COZAAR) 100 MG tablet Take 100 mg by mouth daily       lovastatin (MEVACOR) 40 MG tablet Take 40 mg by mouth At Bedtime.       Magnesium Oxide 420 MG TABS Take 1 tablet by mouth 3 times daily       melatonin 3 MG CAPS Take 1 capsule by mouth At Bedtime       methocarbamol (ROBAXIN) 500 MG tablet Take 500 mg by mouth 3 times daily       metoprolol succinate ER (TOPROL-XL) 50 MG 24 hr tablet Take 50 mg by mouth 2 times daily       Multiple Vitamin (DAILY MULTIVITAMIN PO) Take 1 tablet by mouth daily.       polyethylene glycol (MIRALAX/GLYCOLAX) packet Take 1 packet by mouth daily       potassium chloride 20 MEQ/100ML infusion Inject 20 mEq into the vein once       sertraline (ZOLOFT) 100 MG tablet Take 100 mg by mouth daily         Medical History: any changes in medical history since they were last seen? No    Review of Systems: See Patient Questionnaire  The 14 system ROS was reviewed from the intake questionnaire, and is positive for: HTN, HLD  Any bowel or bladder problems: denies  Mood:  "stable    Physical Exam:  Height 1.905 m (6' 3\"), weight 114.8 kg (253 lb).  General: AAOx3, NAD, no outward pain behaviors noted  Gait: Right LE prosthesis  MSK exam: deferred for conversation    Assessment:   1. Chronic Stump pain/ Neuroma  2. Chronic Opioid use    Holden Jordan is a 69 year old male who is seen at the pain clinic for management of his stump pain that has been present for many years. He has recently (approximately 2 months ago) started using butrans patch and has found this very helpful in reducing the quantity of episodes of what he describes as \"jolts of pain\" from his stump. He does state that when he experiences an episode of pain, that occurs approximately once or twice per month, he is concerned that the butrans patch does not adequately cover the flare in pain. We discussed that it would be safe for him to have one or two tablets available of hydrocodone 5mg to help with breaking the acute episodes. I reiterated taht he must not combine this medication with any other sedating medications that may decrease his respiratory drive. We discussed that if he takes hydroxyzine, he should wait at least 90 minutes before taking the hydrocodone, if it is needed. We discussed that he can have up to 10 tablets available of hydrocodone for these infrequent episodes. If over the next several months, he notices that he is not using all 10 tablets, then the prescription can be decreased to 5 tablets per month.      Plan:  1. Physical Therapy:  Continue HEPs   2. Clinical Health Psychologist to address issues of relaxation, behavioral change, coping style, and other factors important to improvement.  Not indicated  3. Diagnostic Studies:  Not indicated  4. Medication Management:    1. Continue Butrans Patch 10mcg dose  2. Can continue to have Rx of 10 tablets per month of Hydrocodone 5mg/Acetaminophen 325mg for episodes of acute exacerbation (not to be used in combination with other medications, other than " the butrans patch).  3. Can continue Hydroxyzine as needed and use first line for acute exacerbations; would not recommend taking at the same time as Norco.   5. Further procedures recommended: Patient prefers conservative measures and wants to avoid interventions  6. Recommendations to PCP: Please continue Butrans Rx and okay to continue Norco Rx (10 tabs/month) as described above  7. Follow up: PRN (patient does not need to follow up with pain clinic unless new issues arise).     Carey Ariza MD    Pain Medicine  Department of Anesthesiology  [facility]

## 2019-08-15 NOTE — PATIENT INSTRUCTIONS
1. Continue using the butrans patch.     2. Provider will make recommends for hydroxyzine and hydrocodone for you primary care provider.       Follow up: As needed      To speak with a nurse, schedule/reschedule/cancel a clinic appointment, or request a medication refill call: [phone]     You can also reach us by dINK: [email or web address]    For refills, please call on Monday, 1 week before your medication runs out. The doctors are not always in clinic, so this gives us time to get your prescriptions ready.  Please let us know the name of the medication you are requesting a refill of.

## 2019-08-15 NOTE — PROGRESS NOTES
"[facility]    Date of visit: 8/15/2019    Chief complaint:   Chief Complaint   Patient presents with     Pain Management     Follow up/ refills       Interval history:  Holden Jordan was last seen by me on 7/19/2019.      Recommendations/plan at the last visit included:  1. Physical Therapy:  Not indicated  2. Clinical Health Psychologist to address issues of relaxation, behavioral change, coping style, and other factors important to improvement.  Not indicated  3. Diagnostic Studies:  Not indicated  4. Medication Management:  Continue current medication regimen  5. Further procedures recommended: Will discuss neuroma ablation and/or SCS trial at next follow up appointment  6. Recommendations to PCP: none   7. Follow up: 4 weeks    Since his last visit, Holden Jordan reports:  - started using hydroxyzine and notes this helpful along with patch  - has been having fewer episodes of \"jolts\"  - concerned that during worsening episodes of jolts he would require breakthrough medication  - discussed that it is okay to take 1 5mg tablet of Norco during acute exacerbations  - Zbigniew Coulter study - enzyme study    Pain scores:  Pain intensity on average is 2 on a scale of 0-10.     Current pain treatments:   Butrans Patch 10mcg  Hydroxyzine  Gabapentin 100mg TID  Hydrocodone-Acetaminophen 5-325 PRN pain (10 tabs/month available)      Side Effects: denies any problems    Medications:  Current Outpatient Medications   Medication Sig Dispense Refill     aspirin 81 MG tablet Take 1 tablet by mouth daily.       Aspirin-Acetaminophen-Caffeine (EXCEDRIN PO) Take  by mouth as needed. Up to once a week.       buprenorphine (BUTRANS) 10 MCG/HR WK patch Place 1 patch onto the skin every 7 days 4 patch 0     chlorthalidone (HYGROTEN) 25 MG tablet Take 1 tablet by mouth daily.       cholecalciferol (VITAMIN D) 400 UNIT TABS Take 400 Units by mouth daily.       clindamycin (CLEOCIN) 300 MG capsule TK TWO CS PO ONE " "HOUR PRIOR TO DENTAL APPOINTMENT.  2     CLINDAMYCIN HCL PO Take 75 mg by mouth       finasteride (PROSCAR) 5 MG tablet Take 5 mg by mouth daily       fish oil-omega-3 fatty acids (FISH OIL) 1000 MG capsule Take 2 g by mouth daily.       gabapentin (NEURONTIN) 100 MG capsule Take 1 capsule (100 mg) by mouth 3 times daily 90 capsule 1     HYDROcodone-acetaminophen (NORCO) 5-325 MG tablet Take 1 tablet by mouth as needed for severe pain 10 tablets to last a minimum of 30 days. 10 tablet 0     HYDROcodone-acetaminophen (VICODIN) 5-500 MG per tablet Take 1-2 tablets by mouth 3 times daily as needed.       losartan (COZAAR) 100 MG tablet Take 100 mg by mouth daily       lovastatin (MEVACOR) 40 MG tablet Take 40 mg by mouth At Bedtime.       Magnesium Oxide 420 MG TABS Take 1 tablet by mouth 3 times daily       melatonin 3 MG CAPS Take 1 capsule by mouth At Bedtime       methocarbamol (ROBAXIN) 500 MG tablet Take 500 mg by mouth 3 times daily       metoprolol succinate ER (TOPROL-XL) 50 MG 24 hr tablet Take 50 mg by mouth 2 times daily       Multiple Vitamin (DAILY MULTIVITAMIN PO) Take 1 tablet by mouth daily.       polyethylene glycol (MIRALAX/GLYCOLAX) packet Take 1 packet by mouth daily       potassium chloride 20 MEQ/100ML infusion Inject 20 mEq into the vein once       sertraline (ZOLOFT) 100 MG tablet Take 100 mg by mouth daily         Medical History: any changes in medical history since they were last seen? No    Review of Systems: See Patient Questionnaire  The 14 system ROS was reviewed from the intake questionnaire, and is positive for: HTN, HLD  Any bowel or bladder problems: denies  Mood: stable    Physical Exam:  Height 1.905 m (6' 3\"), weight 114.8 kg (253 lb).  General: AAOx3, NAD, no outward pain behaviors noted  Gait: Right LE prosthesis  MSK exam: deferred for conversation    Assessment:   1. Chronic Stump pain/ Neuroma  2. Chronic Opioid use    Holden Jordan is a 69 year old male who is seen at the " "pain clinic for management of his stump pain that has been present for many years. He has recently (approximately 2 months ago) started using butrans patch and has found this very helpful in reducing the quantity of episodes of what he describes as \"jolts of pain\" from his stump. He does state that when he experiences an episode of pain, that occurs approximately once or twice per month, he is concerned that the butrans patch does not adequately cover the flare in pain. We discussed that it would be safe for him to have one or two tablets available of hydrocodone 5mg to help with breaking the acute episodes. I reiterated taht he must not combine this medication with any other sedating medications that may decrease his respiratory drive. We discussed that if he takes hydroxyzine, he should wait at least 90 minutes before taking the hydrocodone, if it is needed. We discussed that he can have up to 10 tablets available of hydrocodone for these infrequent episodes. If over the next several months, he notices that he is not using all 10 tablets, then the prescription can be decreased to 5 tablets per month.      Plan:  1. Physical Therapy:  Continue HEPs   2. Clinical Health Psychologist to address issues of relaxation, behavioral change, coping style, and other factors important to improvement.  Not indicated  3. Diagnostic Studies:  Not indicated  4. Medication Management:    1. Continue Butrans Patch 10mcg dose  2. Can continue to have Rx of 10 tablets per month of Hydrocodone 5mg/Acetaminophen 325mg for episodes of acute exacerbation (not to be used in combination with other medications, other than the butrans patch).  3. Can continue Hydroxyzine as needed and use first line for acute exacerbations; would not recommend taking at the same time as Norco.   5. Further procedures recommended: Patient prefers conservative measures and wants to avoid interventions  6. Recommendations to PCP: Please continue Butrans Rx and " okay to continue Norco Rx (10 tabs/month) as described above  7. Follow up: PRN (patient does not need to follow up with pain clinic unless new issues arise).     Carey Ariza MD    Pain Medicine  Department of Anesthesiology  [facility]      Answers for HPI/ROS submitted by the patient on 8/15/2019   KRISTAL 7 TOTAL SCORE: 10

## 2019-08-16 ASSESSMENT — ANXIETY QUESTIONNAIRES: GAD7 TOTAL SCORE: 10

## 2019-08-19 ENCOUNTER — TELEPHONE (OUTPATIENT)
Dept: ANESTHESIOLOGY | Facility: CLINIC | Age: 69
End: 2019-08-19

## 2019-08-19 NOTE — TELEPHONE ENCOUNTER
[facility] Call Center    Phone Message    May a detailed message be left on voicemail: yes    Reason for Call: Other: pt's wife calling to discuss a fax that was supposed to be sent, please call pt's wife back at number provided to discuss further   [phone] is work # to contact Karolyn until 5pm      Action Taken: Message routed to:  [facility] Pain Clinic

## 2019-08-19 NOTE — LETTER
2019      Holden Jordan  [address] [facility] [address]    : 48      Dear Dr. Verner,    Mr. Jordan has been seen by our office for several months- we have stabilized him on the following prescriptions:    buprenorphine (BUTRANS) 10 MCG/HR WK patch- Place 1 patch onto the skin every 7 days.   4 patches dispensed/ 30 days.     HYDROcodone-acetaminophen (NORCO) 5-325 MG tablet- Take 1 tablet by mouth daily as needed for severe pain. 10 tablets dispensed- to last a minimum of 30 days.     At his last appointment with me- On 8/15/19, we discussed that the Mr. Jordan did not need to continue to follow up with our office, since he is on a stable dose- and as We appear to only be making recommendations- Because [facility] to the [facility].   Please continue medications per your discretion, and we will direct the Mr. Jordan to follow up with your office for refills.   Mr. Jordan is available to follow up with us in the future for further recommendations if indicated.     Please call our office if you have additional questions.       Sincerely,      Carey Ariza MD  [phone]- [facility] Line Phone Number.  [phone]- Clinic Phone Number.  [phone]- Clinic Fax Number.

## 2019-08-20 NOTE — TELEPHONE ENCOUNTER
LPN called and spoke to Dr. Ariza regarding the pt's and wifes requests.   Dr. Ariza stated that at the pt's appointment on 8/15/19 they had discussed that the pt did not need to keep following up with the Pain clinic, as the [facility] the medications, and pt has remained on a stable dose.     LPN called and left a message with the Nursing staff for Dr. Verner, asking that someone from their office give them a call regarding coordinating care for the pt.   The RN from the call center, stated that Dr. Verner was going to need the recommendations in writing.     LPN drafted letter with Dr. Ariza's recommendations- and faxed it to Dr. Verners office.     Pt was called and updated of situation.   Pt denied needing any further assistance at this time.     Becky Pendleton LPN

## 2019-08-20 NOTE — TELEPHONE ENCOUNTER
[facility] Call Center    Phone Message    May a detailed message be left on voicemail: yes    Reason for Call: Medication Question or concern regarding medication   Prescription Clarification  Name of Medication: buprenorphine (BUTRANS) 10 MCG/HR WK patch and HYDROcodone-acetaminophen (NORCO) 5-325 MG tablet  Prescribing Provider: Dr. Pike and Dr. Ariza, respectively   [facility]    What on the order needs clarification? Pt's wife calling to f/u on request for meds. She states the [facility] has not [facility] the order for hydrocodone, and that pt is out of the buprenorphine patch. She would like the order for the patches to be extended, and asks that staff call pt when orders are sent to the [facility]. [facility] Ver[facility] is [phone], they want it sent there too.    Action Taken: Message routed to:  [facility] Pain

## 2019-08-20 NOTE — TELEPHONE ENCOUNTER
[facility] Call Center    Phone Message    May a detailed message be left on voicemail: yes    Reason for Call: Form or Letter   Type or form/letter needing completion: Pt's wife/ significant other was wondering if it was possible for the clinic to send the Pt and Karolyn a copy of the letter (recommendations) that was faxed to Dr. Verner. Karolyn would like the letter faxed to her office at: [phone], Attn: Karolyn Leigh. Karolyn will be at the office today and tomorrow. Please call with any questions or update to this request.   Provider: Dr. Ariza  Date form needed: N/A  Once completed: Fax form to: [phone]      Action Taken: Message routed to:  [facility] Pain

## 2019-09-11 ENCOUNTER — TELEPHONE (OUTPATIENT)
Dept: ANESTHESIOLOGY | Facility: CLINIC | Age: 69
End: 2019-09-11

## 2019-09-13 NOTE — PROGRESS NOTES
Patient did not return for further treatment and no additional progress was noted.  Please refer to the progress note and goal flowsheet completed on 07/25/19 for discharge information.

## 2019-09-19 ENCOUNTER — TELEPHONE (OUTPATIENT)
Dept: ANESTHESIOLOGY | Facility: CLINIC | Age: 69
End: 2019-09-19

## 2019-09-19 NOTE — TELEPHONE ENCOUNTER
LPN called and spoke to the pt, and offered them an appointment with a provider tomorrow 9/20/19.    Pt was unavailable to take the appointment as they are currently ill.     Pt was given the clinic phone number and was directed to call the clinic back at their convenience to schedule an appointment.     Pt verbalized understanding.     Becky Pendleton LPN

## 2019-11-27 PROBLEM — R39.15 URINARY URGENCY: Status: RESOLVED | Noted: 2019-05-23 | Resolved: 2019-11-27

## 2021-12-09 NOTE — TELEPHONE ENCOUNTER
[facility] Call Center    Phone Message    May a detailed message be left on voicemail: yes    Reason for Call: Medication Question or concern regarding medication   Prescription Clarification  Name of Medication: buprenorphine (BUTRANS) 10 MCG/HR WK patch  Prescribing Provider: Dr. Pike   What on the order needs clarification? Holden was wondering if the dose could be increased on the patches, Pts wife states he still having quite a bit of pain. Please reach out to Holden to discuss.          Action Taken: Message routed to:  [facility] Pain  
LPN called and spoke to the pt.   Pt was wanting to get into clinic before their appointment with their PCP tomorrow, or ask that the MD verbally give authorization to increase the Butrans patches.   LPN informed pt, that due to safety reasons- Providers do not make changes to mediations over the phone.   It was recommended that the pt follow up in clinic. Pt will be contacted when there is an opening on Dr. Pike or Dr. Ariza's schedule- as he has previously seen both of them in clinic.     Pt was agreeable to this, and stated they will update their PCP ([facility], who is [facility] the Butrans patches)       Becky Pendleton LPN    
none

## (undated) RX ORDER — CIPROFLOXACIN 500 MG/1
TABLET, FILM COATED ORAL
Status: DISPENSED
Start: 2019-04-08